# Patient Record
Sex: FEMALE | Race: WHITE | NOT HISPANIC OR LATINO | URBAN - METROPOLITAN AREA
[De-identification: names, ages, dates, MRNs, and addresses within clinical notes are randomized per-mention and may not be internally consistent; named-entity substitution may affect disease eponyms.]

---

## 2024-02-10 ENCOUNTER — INPATIENT (INPATIENT)
Facility: HOSPITAL | Age: 89
LOS: 3 days | Discharge: HOME | DRG: 178 | End: 2024-02-14
Attending: STUDENT IN AN ORGANIZED HEALTH CARE EDUCATION/TRAINING PROGRAM | Admitting: HOSPITALIST
Payer: MEDICARE

## 2024-02-10 VITALS
SYSTOLIC BLOOD PRESSURE: 146 MMHG | RESPIRATION RATE: 18 BRPM | DIASTOLIC BLOOD PRESSURE: 75 MMHG | TEMPERATURE: 99 F | OXYGEN SATURATION: 99 % | WEIGHT: 141.98 LBS | HEART RATE: 95 BPM

## 2024-02-10 DIAGNOSIS — Z90.49 ACQUIRED ABSENCE OF OTHER SPECIFIED PARTS OF DIGESTIVE TRACT: Chronic | ICD-10-CM

## 2024-02-10 DIAGNOSIS — N39.0 URINARY TRACT INFECTION, SITE NOT SPECIFIED: ICD-10-CM

## 2024-02-10 LAB
ALBUMIN SERPL ELPH-MCNC: 3.9 G/DL — SIGNIFICANT CHANGE UP (ref 3.5–5.2)
ALP SERPL-CCNC: 132 U/L — HIGH (ref 30–115)
ALT FLD-CCNC: 62 U/L — HIGH (ref 0–41)
ANION GAP SERPL CALC-SCNC: 14 MMOL/L — SIGNIFICANT CHANGE UP (ref 7–14)
APPEARANCE UR: ABNORMAL
AST SERPL-CCNC: 102 U/L — HIGH (ref 0–41)
BACTERIA # UR AUTO: ABNORMAL /HPF
BASOPHILS # BLD AUTO: 0.03 K/UL — SIGNIFICANT CHANGE UP (ref 0–0.2)
BASOPHILS NFR BLD AUTO: 0.4 % — SIGNIFICANT CHANGE UP (ref 0–1)
BILIRUB SERPL-MCNC: 0.3 MG/DL — SIGNIFICANT CHANGE UP (ref 0.2–1.2)
BILIRUB UR-MCNC: NEGATIVE — SIGNIFICANT CHANGE UP
BUN SERPL-MCNC: 17 MG/DL — SIGNIFICANT CHANGE UP (ref 10–20)
CALCIUM SERPL-MCNC: 9.3 MG/DL — SIGNIFICANT CHANGE UP (ref 8.4–10.4)
CAST: 2 /LPF — SIGNIFICANT CHANGE UP (ref 0–4)
CHLORIDE SERPL-SCNC: 103 MMOL/L — SIGNIFICANT CHANGE UP (ref 98–110)
CO2 SERPL-SCNC: 24 MMOL/L — SIGNIFICANT CHANGE UP (ref 17–32)
COLOR SPEC: YELLOW — SIGNIFICANT CHANGE UP
CREAT SERPL-MCNC: 0.9 MG/DL — SIGNIFICANT CHANGE UP (ref 0.7–1.5)
DIFF PNL FLD: NEGATIVE — SIGNIFICANT CHANGE UP
EGFR: 61 ML/MIN/1.73M2 — SIGNIFICANT CHANGE UP
EOSINOPHIL # BLD AUTO: 0 K/UL — SIGNIFICANT CHANGE UP (ref 0–0.7)
EOSINOPHIL NFR BLD AUTO: 0 % — SIGNIFICANT CHANGE UP (ref 0–8)
FLUAV AG NPH QL: SIGNIFICANT CHANGE UP
FLUBV AG NPH QL: SIGNIFICANT CHANGE UP
GLUCOSE SERPL-MCNC: 114 MG/DL — HIGH (ref 70–99)
GLUCOSE UR QL: NEGATIVE MG/DL — SIGNIFICANT CHANGE UP
HCT VFR BLD CALC: 39.6 % — SIGNIFICANT CHANGE UP (ref 37–47)
HGB BLD-MCNC: 12.4 G/DL — SIGNIFICANT CHANGE UP (ref 12–16)
IMM GRANULOCYTES NFR BLD AUTO: 0.4 % — HIGH (ref 0.1–0.3)
KETONES UR-MCNC: ABNORMAL MG/DL
LEUKOCYTE ESTERASE UR-ACNC: ABNORMAL
LIDOCAIN IGE QN: 36 U/L — SIGNIFICANT CHANGE UP (ref 7–60)
LYMPHOCYTES # BLD AUTO: 0.54 K/UL — LOW (ref 1.2–3.4)
LYMPHOCYTES # BLD AUTO: 7 % — LOW (ref 20.5–51.1)
MCHC RBC-ENTMCNC: 28.4 PG — SIGNIFICANT CHANGE UP (ref 27–31)
MCHC RBC-ENTMCNC: 31.3 G/DL — LOW (ref 32–37)
MCV RBC AUTO: 90.6 FL — SIGNIFICANT CHANGE UP (ref 81–99)
MONOCYTES # BLD AUTO: 0.76 K/UL — HIGH (ref 0.1–0.6)
MONOCYTES NFR BLD AUTO: 9.9 % — HIGH (ref 1.7–9.3)
NEUTROPHILS # BLD AUTO: 6.3 K/UL — SIGNIFICANT CHANGE UP (ref 1.4–6.5)
NEUTROPHILS NFR BLD AUTO: 82.3 % — HIGH (ref 42.2–75.2)
NITRITE UR-MCNC: POSITIVE
NRBC # BLD: 0 /100 WBCS — SIGNIFICANT CHANGE UP (ref 0–0)
PH UR: 5.5 — SIGNIFICANT CHANGE UP (ref 5–8)
PLATELET # BLD AUTO: 158 K/UL — SIGNIFICANT CHANGE UP (ref 130–400)
PMV BLD: 10.8 FL — HIGH (ref 7.4–10.4)
POTASSIUM SERPL-MCNC: 4.6 MMOL/L — SIGNIFICANT CHANGE UP (ref 3.5–5)
POTASSIUM SERPL-SCNC: 4.6 MMOL/L — SIGNIFICANT CHANGE UP (ref 3.5–5)
PROT SERPL-MCNC: 6.4 G/DL — SIGNIFICANT CHANGE UP (ref 6–8)
PROT UR-MCNC: 30 MG/DL
RBC # BLD: 4.37 M/UL — SIGNIFICANT CHANGE UP (ref 4.2–5.4)
RBC # FLD: 12.9 % — SIGNIFICANT CHANGE UP (ref 11.5–14.5)
RBC CASTS # UR COMP ASSIST: 2 /HPF — SIGNIFICANT CHANGE UP (ref 0–4)
RSV RNA NPH QL NAA+NON-PROBE: SIGNIFICANT CHANGE UP
SARS-COV-2 RNA SPEC QL NAA+PROBE: DETECTED
SODIUM SERPL-SCNC: 141 MMOL/L — SIGNIFICANT CHANGE UP (ref 135–146)
SP GR SPEC: 1.03 — SIGNIFICANT CHANGE UP (ref 1–1.03)
SQUAMOUS # UR AUTO: 1 /HPF — SIGNIFICANT CHANGE UP (ref 0–5)
UROBILINOGEN FLD QL: 1 MG/DL — SIGNIFICANT CHANGE UP (ref 0.2–1)
WBC # BLD: 7.66 K/UL — SIGNIFICANT CHANGE UP (ref 4.8–10.8)
WBC # FLD AUTO: 7.66 K/UL — SIGNIFICANT CHANGE UP (ref 4.8–10.8)
WBC UR QL: 10 /HPF — HIGH (ref 0–5)

## 2024-02-10 PROCEDURE — 74177 CT ABD & PELVIS W/CONTRAST: CPT | Mod: 26,MA

## 2024-02-10 PROCEDURE — 92610 EVALUATE SWALLOWING FUNCTION: CPT | Mod: GN

## 2024-02-10 PROCEDURE — 97116 GAIT TRAINING THERAPY: CPT | Mod: GP

## 2024-02-10 PROCEDURE — 84443 ASSAY THYROID STIM HORMONE: CPT

## 2024-02-10 PROCEDURE — 70450 CT HEAD/BRAIN W/O DYE: CPT | Mod: 26,MA

## 2024-02-10 PROCEDURE — 82565 ASSAY OF CREATININE: CPT

## 2024-02-10 PROCEDURE — 97530 THERAPEUTIC ACTIVITIES: CPT | Mod: GP

## 2024-02-10 PROCEDURE — 82550 ASSAY OF CK (CPK): CPT

## 2024-02-10 PROCEDURE — 80076 HEPATIC FUNCTION PANEL: CPT

## 2024-02-10 PROCEDURE — 72125 CT NECK SPINE W/O DYE: CPT | Mod: 26,MA

## 2024-02-10 PROCEDURE — 85025 COMPLETE CBC W/AUTO DIFF WBC: CPT

## 2024-02-10 PROCEDURE — 72170 X-RAY EXAM OF PELVIS: CPT | Mod: 26

## 2024-02-10 PROCEDURE — 36415 COLL VENOUS BLD VENIPUNCTURE: CPT

## 2024-02-10 PROCEDURE — 93010 ELECTROCARDIOGRAM REPORT: CPT

## 2024-02-10 PROCEDURE — 82977 ASSAY OF GGT: CPT

## 2024-02-10 PROCEDURE — 99285 EMERGENCY DEPT VISIT HI MDM: CPT

## 2024-02-10 PROCEDURE — 71260 CT THORAX DX C+: CPT | Mod: 26,MA

## 2024-02-10 PROCEDURE — 99053 MED SERV 10PM-8AM 24 HR FAC: CPT

## 2024-02-10 PROCEDURE — 93970 EXTREMITY STUDY: CPT

## 2024-02-10 PROCEDURE — 71045 X-RAY EXAM CHEST 1 VIEW: CPT | Mod: 26

## 2024-02-10 PROCEDURE — 97162 PT EVAL MOD COMPLEX 30 MIN: CPT | Mod: GP

## 2024-02-10 PROCEDURE — 93005 ELECTROCARDIOGRAM TRACING: CPT

## 2024-02-10 PROCEDURE — 80053 COMPREHEN METABOLIC PANEL: CPT

## 2024-02-10 PROCEDURE — 83735 ASSAY OF MAGNESIUM: CPT

## 2024-02-10 PROCEDURE — 99222 1ST HOSP IP/OBS MODERATE 55: CPT | Mod: GC

## 2024-02-10 RX ORDER — SIMVASTATIN 20 MG/1
1 TABLET, FILM COATED ORAL
Refills: 0 | DISCHARGE

## 2024-02-10 RX ORDER — SODIUM CHLORIDE 9 MG/ML
1000 INJECTION INTRAMUSCULAR; INTRAVENOUS; SUBCUTANEOUS ONCE
Refills: 0 | Status: COMPLETED | OUTPATIENT
Start: 2024-02-10 | End: 2024-02-10

## 2024-02-10 RX ORDER — CEFEPIME 1 G/1
2000 INJECTION, POWDER, FOR SOLUTION INTRAMUSCULAR; INTRAVENOUS EVERY 8 HOURS
Refills: 0 | Status: DISCONTINUED | OUTPATIENT
Start: 2024-02-10 | End: 2024-02-10

## 2024-02-10 RX ORDER — ATORVASTATIN CALCIUM 80 MG/1
40 TABLET, FILM COATED ORAL AT BEDTIME
Refills: 0 | Status: DISCONTINUED | OUTPATIENT
Start: 2024-02-10 | End: 2024-02-14

## 2024-02-10 RX ORDER — ACETAMINOPHEN 500 MG
650 TABLET ORAL ONCE
Refills: 0 | Status: COMPLETED | OUTPATIENT
Start: 2024-02-10 | End: 2024-02-10

## 2024-02-10 RX ORDER — AZITHROMYCIN 500 MG/1
500 TABLET, FILM COATED ORAL ONCE
Refills: 0 | Status: COMPLETED | OUTPATIENT
Start: 2024-02-10 | End: 2024-02-10

## 2024-02-10 RX ORDER — AZITHROMYCIN 500 MG/1
500 TABLET, FILM COATED ORAL EVERY 24 HOURS
Refills: 0 | Status: DISCONTINUED | OUTPATIENT
Start: 2024-02-11 | End: 2024-02-13

## 2024-02-10 RX ORDER — VANCOMYCIN HCL 1 G
1000 VIAL (EA) INTRAVENOUS ONCE
Refills: 0 | Status: COMPLETED | OUTPATIENT
Start: 2024-02-10 | End: 2024-02-10

## 2024-02-10 RX ORDER — CEFEPIME 1 G/1
INJECTION, POWDER, FOR SOLUTION INTRAMUSCULAR; INTRAVENOUS
Refills: 0 | Status: DISCONTINUED | OUTPATIENT
Start: 2024-02-10 | End: 2024-02-10

## 2024-02-10 RX ORDER — VANCOMYCIN HCL 1 G
VIAL (EA) INTRAVENOUS
Refills: 0 | Status: DISCONTINUED | OUTPATIENT
Start: 2024-02-10 | End: 2024-02-10

## 2024-02-10 RX ORDER — AZITHROMYCIN 500 MG/1
TABLET, FILM COATED ORAL
Refills: 0 | Status: DISCONTINUED | OUTPATIENT
Start: 2024-02-10 | End: 2024-02-13

## 2024-02-10 RX ORDER — VANCOMYCIN HCL 1 G
1000 VIAL (EA) INTRAVENOUS EVERY 12 HOURS
Refills: 0 | Status: DISCONTINUED | OUTPATIENT
Start: 2024-02-10 | End: 2024-02-10

## 2024-02-10 RX ORDER — CEFEPIME 1 G/1
2000 INJECTION, POWDER, FOR SOLUTION INTRAMUSCULAR; INTRAVENOUS ONCE
Refills: 0 | Status: COMPLETED | OUTPATIENT
Start: 2024-02-10 | End: 2024-02-10

## 2024-02-10 RX ORDER — SENNA PLUS 8.6 MG/1
2 TABLET ORAL AT BEDTIME
Refills: 0 | Status: DISCONTINUED | OUTPATIENT
Start: 2024-02-10 | End: 2024-02-14

## 2024-02-10 RX ORDER — CEFTRIAXONE 500 MG/1
1000 INJECTION, POWDER, FOR SOLUTION INTRAMUSCULAR; INTRAVENOUS EVERY 24 HOURS
Refills: 0 | Status: DISCONTINUED | OUTPATIENT
Start: 2024-02-10 | End: 2024-02-13

## 2024-02-10 RX ORDER — ENOXAPARIN SODIUM 100 MG/ML
40 INJECTION SUBCUTANEOUS EVERY 24 HOURS
Refills: 0 | Status: DISCONTINUED | OUTPATIENT
Start: 2024-02-10 | End: 2024-02-14

## 2024-02-10 RX ADMIN — Medication 650 MILLIGRAM(S): at 20:40

## 2024-02-10 RX ADMIN — Medication 650 MILLIGRAM(S): at 08:03

## 2024-02-10 RX ADMIN — Medication 250 MILLIGRAM(S): at 08:44

## 2024-02-10 RX ADMIN — AZITHROMYCIN 255 MILLIGRAM(S): 500 TABLET, FILM COATED ORAL at 14:39

## 2024-02-10 RX ADMIN — CEFTRIAXONE 100 MILLIGRAM(S): 500 INJECTION, POWDER, FOR SOLUTION INTRAMUSCULAR; INTRAVENOUS at 19:49

## 2024-02-10 RX ADMIN — CEFEPIME 100 MILLIGRAM(S): 1 INJECTION, POWDER, FOR SOLUTION INTRAMUSCULAR; INTRAVENOUS at 08:44

## 2024-02-10 RX ADMIN — SODIUM CHLORIDE 1000 MILLILITER(S): 9 INJECTION INTRAMUSCULAR; INTRAVENOUS; SUBCUTANEOUS at 08:44

## 2024-02-10 NOTE — H&P ADULT - NSHPPHYSICALEXAM_GEN_ALL_CORE
LOS:     VITALS:   T(C): 38.6 (02-10-24 @ 07:13), Max: 38.6 (02-10-24 @ 07:13)  HR: 98 (02-10-24 @ 06:58) (95 - 98)  BP: 146/75 (02-10-24 @ 06:05) (146/75 - 146/75)  RR: 18 (02-10-24 @ 06:05) (18 - 18)  SpO2: 99% (02-10-24 @ 06:05) (99% - 99%)    GENERAL: NAD, lying in bed comfortably  HEAD:  Atraumatic, Normocephalic  EYES: EOMI, PERRLA, conjunctiva and sclera clear  ENT: Moist mucous membranes  NECK: Supple, No JVD  CHEST/LUNG: Clear to auscultation bilaterally; No rales, rhonchi, wheezing, or rubs. Unlabored respirations  HEART: Regular rate and rhythm; No murmurs, rubs, or gallops  ABDOMEN: BSx4; Soft, nontender, nondistended  EXTREMITIES:  2+ Peripheral Pulses, brisk capillary refill. No clubbing, cyanosis, or edema  NERVOUS SYSTEM:  A&Ox3, 5/5 motor bilateral upper and lower extremity, no sensory deficit   SKIN: No rashes or lesions LOS:     VITALS:   T(C): 38.6 (02-10-24 @ 07:13), Max: 38.6 (02-10-24 @ 07:13)  HR: 98 (02-10-24 @ 06:58) (95 - 98)  BP: 146/75 (02-10-24 @ 06:05) (146/75 - 146/75)  RR: 18 (02-10-24 @ 06:05) (18 - 18)  SpO2: 99% (02-10-24 @ 06:05) (99% - 99%)    GENERAL: NAD, lying in bed comfortably, pleasant, answering questions   HEAD:  Atraumatic, Normocephalic  EYES: EOMI, PERRLA, conjunctiva and sclera clear  ENT: Moist mucous membranes  NECK: Supple, No JVD  CHEST/LUNG: Clear to auscultation bilaterally;   HEART: Regular rate and rhythm; No murmurs, rubs, or gallops  ABDOMEN: BSx4; Soft, nontender, nondistended  EXTREMITIES:  2+ Peripheral Pulses, no edema  NERVOUS SYSTEM:  A&Ox2, 5/5 motor bilateral upper and lower extremity, no sensory deficit   SKIN: No rashes or lesions

## 2024-02-10 NOTE — ED PROVIDER NOTE - PROGRESS NOTE DETAILS
Signout to Dr. Asif follow-up and dispo KINGSLEY-- pt signed out to me by Dr. Cooper pending CTs, labs, reeval    on reeval, pt resting comfortably, shivering, appears diaphoretic, rectal temp 101.5.   family report Patient is AO x 2, speaking clearly, finger to nose intact. Family report Pt is AOx2 at baseline, however yesterday was coughing and seemed more confused.  Son reports patient got up at about 2 AM and was behaving unusually, seemed more confused than usual, and had an episode of incontinence.  Daughter-in-law reports patient has been urinating more than usual.  Patient denies any dysuria, hematuria or known fever.  Family at bedside report Signout to Dr. Martinez follow-up and dispo KINGSLEY-- pt signed out to me by Dr. Cooper pending CTs, labs, reeval    on reeval, pt resting comfortably, shivering, appears diaphoretic, rectal temp 101.5.   family report Patient is AO x 2, speaking clearly, finger to nose intact. Family report Pt is AOx2 at baseline, however yesterday was coughing and seemed more confused.  Son reports patient got up at about 2 AM and was behaving unusually, seemed more confused than usual, and had an episode of incontinence.  Daughter-in-law reports patient has been urinating more than usual.  Patient denies any dysuria, hematuria or known fever. Results of CAT scans, labs, urinalysis reviewed and discussed with patient and family at bedside.    Patient has no abdominal pain, no abdominal tenderness on exam.  Patient and family confirm she did not fall onto her side or hit her abdomen against anything last night.    Patient started on antibiotics and fluids for UTI and possible pneumonia.  Patient and family agree with plan for admission.

## 2024-02-10 NOTE — ED ADULT NURSE NOTE - OBJECTIVE STATEMENT
pt presented to ED from home s/p unwitnessed fall from home. pt with recent frequent falls. hx of worsening dementia. as per daughter at bedside, daughter heard a "thump" and when she found pt laying on the floor, pt was "acting confused" since fall, pt returned back to baseline. denies any pain. denies any CP, sob, N/V/D. no injuries noted.

## 2024-02-10 NOTE — H&P ADULT - ATTENDING COMMENTS
89 yo F PMHx dementia, dyslipidemia presented for evaluation of a fall. Pt was getting changed upstairs. Family downstairs heard a thud and went upstairs to find her on the floor. This is the second time this week this occurred. Family does not believe she had LOC. AFter the first fall family took pt to her PMD who ordered a CTH which wasn't yet completed. AFter second fall family brought her here. Trauma work up was negative but while in ED pt found to be febrile. Family noticed that she is urinating more than normal.    Recurrent falls  Dementia   Deconditioning   - suspect underlying infection contributing  - pt eval, uses cane at baseline  - fall precautions  - trauma work up negative, pt noted to have severe chronic microvascular ischemic changes, thryoid nodule, and perisplenic fluid    Fever  Suspect pneumonia vs UTI  - sepsis not present on admission  - has cough  - CT chest: Indeterminate irregular right lower lobe pulmonary opacity with surrounding groundglass, measuring up to 1.8 x 1.1 cm. While   infectious/inflammatory etiology is on differential, cannot exclude neoplastic etiology given morphology.   - s/p cefepime and vancomycin in ED   - c/w  ceftriaxone and azithromycin for possible gram negative pneumonia  - will need repeat image as outpt for resolution  - f/u covid/flu PCR   - f/u blood cx, urine cultures  - f/u urine strep and legionella     Transaminitis   - pt s/p cholecystectomy   - possibly related to fall, check CK and ggt    DLD  - c/w simvastatin     Thyroid nodules  - outpt ultrasound    Discussed with family at bedside

## 2024-02-10 NOTE — ED ADULT TRIAGE NOTE - CHIEF COMPLAINT QUOTE
BIBA from home s/p unwitnessed fall. Patient with recent fall last week and worsening dementia. As per daughter patient with jumbled speech post fall but has since recovered to baseline. Patient denies pain and discomfort, no signs of trauma or injury noted. FS in triage 113.

## 2024-02-10 NOTE — ED PROVIDER NOTE - CLINICAL SUMMARY MEDICAL DECISION MAKING FREE TEXT BOX
Patient presented status post fall as documented.  On arrival to ED, patient initially borderline febrile, which then subsequently progressed to febrile in the ED to 101.5, but hemodynamically stable, fully neurovascularly intact, overall well-appearing, no external signs of trauma on exam.  Patient was seen on arrival which point patient obtained labs which were grossly unremarkable including no significant leukocytosis, anemia, signs of dehydration/ESTEBAN, transaminitis or significant electrolyte abnormalities.  UA showed likely UTI which is likely source of infection.  Pan scan was obtained and negative for acute traumatic injury or any other emergent pathologies.  Patient was started on IV antibiotics in the ED, given the above, in conjunction with the fact that patient unable to ambulate, patient will require admission for IV antibiotics and further management.  Hemodynamically stable at time of admission.

## 2024-02-10 NOTE — H&P ADULT - HISTORY OF PRESENT ILLNESS
89-year-old female past medical history of Dementia (AOx2 at baseline) and hyperlipidemia coming in here for repeated falls.  Patient had a fall approximately 1 week ago mechanical and today patient was attempting to put on her clothes when she bent forward and fell straight down.  Was "acting confused "for a couple minutes but now back to baseline. Patient currently denies any complaints or pain. Patient lives with her son and daughter-in-law who report patient has had a cough since yesterday and had 1 episode of urinary incontinence last night.    Vitals in ED: T 99.1, HR 95, /75, spo2 99% on RA   Pt had fever later in .5   Labs unremarkable except for mildly elevated LFTs   UA : WBC 10, too many bacteria, +ve nitrite, small LE   CT HEAD:  No acute intracranial findings.  Severe chronic microvascular ischemic changes.    CT CERVICAL SPINE:  No acute cervical fracture or facet subluxation.    CT Chest/A/P w IV con:   Trace subcapsular splenic fluid, age indeterminate and of indeterminate   etiology. Cannot exclude acute traumatic injury. Clinical correlation   recommended.  Indeterminate irregular right lower lobe pulmonary opacity with   surrounding groundglass, measuring up to 1.8 x 1.1 cm. While   infectious/inflammatory etiology is on differential, cannot exclude   neoplastic etiology given morphology. Recommend correlation with prior   imaging if available and further workup/short-term follow-up CT or PET/CT   evaluation.  Thyroid nodules. Recommend follow with outpatient sonography.    Patient admitted for workup of recurrent falls.  89-year-old female past medical history of Dementia (AOx2 at baseline) and hyperlipidemia coming in here for recurrent falls. The son and daughter in law with whom the patient lives are at bedside. Patient reported feeling well, does not remember what happened or how she fell. She reported only cough productive of whitish sputum for the last week. Acc to daughter in law pt first had an unwitnessed fall last week where they heard her fall but did not see how it happened. No LOC. Today at 4 am in the morning they heard her fall again in her room. They found her in the floor wet with her pants off. She was trying to change her clothes and tripped and fell. No LOC, +ve head trauma. Acc to family pt is oriented to person and place not to time at baseline. Sometimes she wakes up confused at night and wanders inside the house. This also happened this week. The urinary incontinence is not new, it has been going on for 6 months now. She uses a cane usually, but this week thy noticed she is more weak and shaking when walking. No fever, chills, abdominal pain, N&V, dysuria, sore throat, SOB, chest pain, focal weakness, headache, blurry visio, slurred speech.     Vitals in ED: T 99.1, HR 95, /75, spo2 99% on RA   Pt had fever later in .5   Labs unremarkable except for mildly elevated LFTs   UA : WBC 10, too many bacteria, +ve nitrite, small LE   CT HEAD:  No acute intracranial findings.  Severe chronic microvascular ischemic changes.  CT CERVICAL SPINE:  No acute cervical fracture or facet subluxation.  CT Chest/A/P w IV con:   Trace subcapsular splenic fluid, age indeterminate and of indeterminate   etiology. Cannot exclude acute traumatic injury. Clinical correlation   recommended.  Indeterminate irregular right lower lobe pulmonary opacity with   surrounding groundglass, measuring up to 1.8 x 1.1 cm. While   infectious/inflammatory etiology is on differential, cannot exclude   neoplastic etiology given morphology. Recommend correlation with prior   imaging if available and further workup/short-term follow-up CT or PET/CT   evaluation.  Thyroid nodules. Recommend follow with outpatient sonography.    Patient admitted for workup of recurrent falls.

## 2024-02-10 NOTE — H&P ADULT - CONVERSATION DETAILS
Family reports DNR/DNI status, states they have MOLST at home which they will bring in. If not will have to complete another here, aware full code until documentation provided

## 2024-02-10 NOTE — ED ADULT NURSE NOTE - NSFALLHARMRISKINTERV_ED_ALL_ED

## 2024-02-10 NOTE — ED PROVIDER NOTE - CARE PLAN
Principal Discharge DX:	Urinary tract infection  Secondary Diagnosis:	Fever  Secondary Diagnosis:	Fall   1

## 2024-02-10 NOTE — ED PROVIDER NOTE - ATTENDING CONTRIBUTION TO CARE
89 year-old female to ED after fall.  Patient was attempting to go to the bathroom found by her daughter on the ground.  Was initially a little confused.  No fevers no sick contacts or travels but did have some incontinence.  Now seems back to her baseline and no specific complaints.  She did also have a fall last week so was concerned about head trauma.  vital signs stable exam as noted clear lungs bilaterally conjunctiva pink HEENT normal, regular rate and rhythm abdomen soft nontender and neuro nonfocal. No Residual Tumor Seen Histology Text: There were no malignant cells seen in the sections examined.

## 2024-02-10 NOTE — H&P ADULT - ASSESSMENT
89-year-old female past medical history of Dementia (AOx2 at baseline) and hyperlipidemia coming in here for recurrent falls.     #Recurrent falls   #Hx of dementia   #Deconditioning vs TME from possible PNA   - pt currently at baseline   - CT HEAD: No acute intracranial findings.  Severe chronic microvascular ischemic changes.  - trauma workup negative   - PT eval ( uses cane at baseline )   - low suspicion for cardiac cause, will not be on tele   - low suspicion for seizures, will hold on EEG     #RLL PNA   - pt with fever, cough, generalized weakness   - CT chest: Indeterminate irregular right lower lobe pulmonary opacity with   surrounding groundglass, measuring up to 1.8 x 1.1 cm. While   infectious/inflammatory etiology is on differential, cannot exclude   neoplastic etiology given morphology. Recommend correlation with prior   imaging if available and further workup/short-term follow-up CT or PET/CT   evaluation.  - s/p cefepime and vancomycin in ED   - not septic on admission   - will start pt on ceftriaxone and azithromycin for CAP   - f/u covid/flu PCR   - f/u blood cx     #Asymptomatic bacteruria   - pt asymptomatic   - f/u urine cx   - will receive ceftriaxone anyways for PNA     #Transaminitis   - pt s/p cholecystectomy   - no abd pain   - CT A/P showing CBD dilation which can be NL post cholecystectomy   - trend LFTs, if increasing consider RUQ US     #DL   - c/w simvastatin     #Thyroid nodules  - OP f/u     #DVT ppx: lovenox   #GI ppx: NA   #Diet: DASH   #Activity: as tolerated    89-year-old female past medical history of Dementia (AOx2 at baseline) and hyperlipidemia coming in here for recurrent falls.     #Recurrent falls  #Hx of dementia   #Deconditioning vs TME and weakness from possible PNA   - pt currently at baseline   - CT HEAD: No acute intracranial findings.  Severe chronic microvascular ischemic changes.  - trauma workup negative   - PT eval ( uses cane at baseline )     #RLL PNA   - pt with fever, cough, generalized weakness   - CT chest: Indeterminate irregular right lower lobe pulmonary opacity with   surrounding groundglass, measuring up to 1.8 x 1.1 cm. While   infectious/inflammatory etiology is on differential, cannot exclude   neoplastic etiology given morphology. Recommend correlation with prior   imaging if available and further workup/short-term follow-up CT or PET/CT   evaluation.  - s/p cefepime and vancomycin in ED   - not septic on admission   - will start pt on ceftriaxone and azithromycin for CAP   - f/u covid/flu PCR   - f/u blood cx   - f/u urine strep and legionella     #Asymptomatic bacteruria   - pt asymptomatic   - f/u urine cx   - will receive ceftriaxone anyways for PNA     #Transaminitis   - pt s/p cholecystectomy   - no abd pain   - CT A/P showing CBD dilation which can be NL post cholecystectomy   - trend LFTs, if increasing consider RUQ US     #DL   - c/w simvastatin     #Thyroid nodules  - OP f/u     #DVT ppx: lovenox   #GI ppx: NA   #Diet: DASH   #Activity: as tolerated

## 2024-02-10 NOTE — ED PROVIDER NOTE - CONSIDERATION OF ADMISSION OBSERVATION
Consideration of Admission/Observation Patient s/p fall with (+) sepsis likely 2/2 UTI. Will require admission for IV abx

## 2024-02-10 NOTE — ED PROVIDER NOTE - OBJECTIVE STATEMENT
9-year-old female past medical history of Dementia and hyperlipidemia coming in here for repeated falls.  Patient had a fall approximately 1 week ago mechanical and today patient was attempting to put on her clothes when she bent forward and fell straight down.  Was "acting confused "for a couple minutes but now back to baseline.  And O x 2.  Here with family.  No other complaints. 89-year-old female past medical history of Dementia (AOx2 at baseline) and hyperlipidemia coming in here for repeated falls.  Patient had a fall approximately 1 week ago mechanical and today patient was attempting to put on her clothes when she bent forward and fell straight down.  Was "acting confused "for a couple minutes but now back to baseline. Patient currently denies any complaints or pain. Patient lives with her son and daughter-in-law who report patient has had a cough since yesterday and had 1 episode of urinary incontinence last night.

## 2024-02-10 NOTE — ED PROVIDER NOTE - DIFFERENTIAL DIAGNOSIS
Differential Diagnosis Sepsis.  Urinary tract infection.  Close range. Fall r/o acute traumatic injury. Also assess for underlying causes of frequent falls, including sepsis/UTI, cardiac etiology vs electrolyte abnormality vs dehydration vs anemia vs other metabolic derangement.

## 2024-02-11 LAB
ALBUMIN SERPL ELPH-MCNC: 3.5 G/DL — SIGNIFICANT CHANGE UP (ref 3.5–5.2)
ALP SERPL-CCNC: 120 U/L — HIGH (ref 30–115)
ALT FLD-CCNC: 56 U/L — HIGH (ref 0–41)
ANION GAP SERPL CALC-SCNC: 13 MMOL/L — SIGNIFICANT CHANGE UP (ref 7–14)
AST SERPL-CCNC: 74 U/L — HIGH (ref 0–41)
BASOPHILS # BLD AUTO: 0 K/UL — SIGNIFICANT CHANGE UP (ref 0–0.2)
BASOPHILS NFR BLD AUTO: 0 % — SIGNIFICANT CHANGE UP (ref 0–1)
BILIRUB SERPL-MCNC: 0.2 MG/DL — SIGNIFICANT CHANGE UP (ref 0.2–1.2)
BUN SERPL-MCNC: 19 MG/DL — SIGNIFICANT CHANGE UP (ref 10–20)
BURR CELLS BLD QL SMEAR: PRESENT — SIGNIFICANT CHANGE UP
CALCIUM SERPL-MCNC: 8.9 MG/DL — SIGNIFICANT CHANGE UP (ref 8.4–10.5)
CHLORIDE SERPL-SCNC: 107 MMOL/L — SIGNIFICANT CHANGE UP (ref 98–110)
CK SERPL-CCNC: 680 U/L — HIGH (ref 0–225)
CO2 SERPL-SCNC: 20 MMOL/L — SIGNIFICANT CHANGE UP (ref 17–32)
CREAT SERPL-MCNC: 0.9 MG/DL — SIGNIFICANT CHANGE UP (ref 0.7–1.5)
EGFR: 61 ML/MIN/1.73M2 — SIGNIFICANT CHANGE UP
EOSINOPHIL # BLD AUTO: 0.05 K/UL — SIGNIFICANT CHANGE UP (ref 0–0.7)
EOSINOPHIL NFR BLD AUTO: 0.9 % — SIGNIFICANT CHANGE UP (ref 0–8)
GGT SERPL-CCNC: 40 U/L — SIGNIFICANT CHANGE UP (ref 1–40)
GIANT PLATELETS BLD QL SMEAR: PRESENT — SIGNIFICANT CHANGE UP
GLUCOSE SERPL-MCNC: 84 MG/DL — SIGNIFICANT CHANGE UP (ref 70–99)
HCT VFR BLD CALC: 37.6 % — SIGNIFICANT CHANGE UP (ref 37–47)
HGB BLD-MCNC: 11.8 G/DL — LOW (ref 12–16)
LYMPHOCYTES # BLD AUTO: 0.42 K/UL — LOW (ref 1.2–3.4)
LYMPHOCYTES # BLD AUTO: 7.2 % — LOW (ref 20.5–51.1)
MANUAL SMEAR VERIFICATION: SIGNIFICANT CHANGE UP
MCHC RBC-ENTMCNC: 28.3 PG — SIGNIFICANT CHANGE UP (ref 27–31)
MCHC RBC-ENTMCNC: 31.4 G/DL — LOW (ref 32–37)
MCV RBC AUTO: 90.2 FL — SIGNIFICANT CHANGE UP (ref 81–99)
MONOCYTES # BLD AUTO: 1.1 K/UL — HIGH (ref 0.1–0.6)
MONOCYTES NFR BLD AUTO: 18.9 % — HIGH (ref 1.7–9.3)
NEUTROPHILS # BLD AUTO: 3.89 K/UL — SIGNIFICANT CHANGE UP (ref 1.4–6.5)
NEUTROPHILS NFR BLD AUTO: 65.8 % — SIGNIFICANT CHANGE UP (ref 42.2–75.2)
NEUTS BAND # BLD: 0.9 % — SIGNIFICANT CHANGE UP (ref 0–6)
OVALOCYTES BLD QL SMEAR: SLIGHT — SIGNIFICANT CHANGE UP
PLAT MORPH BLD: NORMAL — SIGNIFICANT CHANGE UP
PLATELET # BLD AUTO: 135 K/UL — SIGNIFICANT CHANGE UP (ref 130–400)
PMV BLD: 11 FL — HIGH (ref 7.4–10.4)
POIKILOCYTOSIS BLD QL AUTO: SIGNIFICANT CHANGE UP
POTASSIUM SERPL-MCNC: 4 MMOL/L — SIGNIFICANT CHANGE UP (ref 3.5–5)
POTASSIUM SERPL-SCNC: 4 MMOL/L — SIGNIFICANT CHANGE UP (ref 3.5–5)
PROT SERPL-MCNC: 5.9 G/DL — LOW (ref 6–8)
RBC # BLD: 4.17 M/UL — LOW (ref 4.2–5.4)
RBC # FLD: 13.1 % — SIGNIFICANT CHANGE UP (ref 11.5–14.5)
RBC BLD AUTO: ABNORMAL
SMUDGE CELLS # BLD: PRESENT — SIGNIFICANT CHANGE UP
SODIUM SERPL-SCNC: 140 MMOL/L — SIGNIFICANT CHANGE UP (ref 135–146)
VARIANT LYMPHS # BLD: 6.3 % — HIGH (ref 0–5)
WBC # BLD: 5.83 K/UL — SIGNIFICANT CHANGE UP (ref 4.8–10.8)
WBC # FLD AUTO: 5.83 K/UL — SIGNIFICANT CHANGE UP (ref 4.8–10.8)

## 2024-02-11 PROCEDURE — 99232 SBSQ HOSP IP/OBS MODERATE 35: CPT

## 2024-02-11 RX ADMIN — AZITHROMYCIN 255 MILLIGRAM(S): 500 TABLET, FILM COATED ORAL at 13:28

## 2024-02-11 RX ADMIN — ATORVASTATIN CALCIUM 40 MILLIGRAM(S): 80 TABLET, FILM COATED ORAL at 00:59

## 2024-02-11 RX ADMIN — ATORVASTATIN CALCIUM 40 MILLIGRAM(S): 80 TABLET, FILM COATED ORAL at 22:04

## 2024-02-11 RX ADMIN — SENNA PLUS 2 TABLET(S): 8.6 TABLET ORAL at 00:59

## 2024-02-11 RX ADMIN — ENOXAPARIN SODIUM 40 MILLIGRAM(S): 100 INJECTION SUBCUTANEOUS at 00:59

## 2024-02-11 RX ADMIN — CEFTRIAXONE 100 MILLIGRAM(S): 500 INJECTION, POWDER, FOR SOLUTION INTRAMUSCULAR; INTRAVENOUS at 19:41

## 2024-02-11 RX ADMIN — SENNA PLUS 2 TABLET(S): 8.6 TABLET ORAL at 22:03

## 2024-02-11 NOTE — PROGRESS NOTE ADULT - ASSESSMENT
89 yo F PMHx dementia, dyslipidemia presented for evaluation of a fall. Pt was getting changed upstairs. Family downstairs heard a thud and went upstairs to find her on the floor. This is the second time this week this occurred. Family does not believe she had LOC. AFter the first fall family took pt to her PMD who ordered a CTH which wasn't yet completed. AFter second fall family brought her here. Trauma work up was negative but while in ED pt found to be febrile. Family noticed that she is urinating more than normal.    Assessment    Recurrent falls with deconditioning; ?Dementia  Episode of confusion yest per family, resolved, now at baseline mental status  Fever with +ve UA, and CT Chest showing RLL opacity ?infection vs inflammation vs neoplasm  Cough, throat pain with COVID+   New onset difficulty swallowing reported  Transaminitis, improving; CK elevated  Thyroid nodules    Plan:  PT eval, uses cane at baseline; fall precautions; Trauma w/u neg  Continue Rocephin and Azithromycin for now  F/u blood cx and urine cx; f/u urine strep and legionella   Isolation precautions  Repeat o/p CT Chest to f/u RLL opacity  Outpt thyroid ultrasound  Resume home meds  SLP eval; Aspiration precautions.   Monitor LFTs daily. F/u ggt    Pending: urine and blood cx, iv abx, SLP eval  Disposition: home w/ home care vs SNF ?   Discussed with pt and  at bedside

## 2024-02-12 ENCOUNTER — TRANSCRIPTION ENCOUNTER (OUTPATIENT)
Age: 89
End: 2024-02-12

## 2024-02-12 LAB
ALBUMIN SERPL ELPH-MCNC: 4 G/DL — SIGNIFICANT CHANGE UP (ref 3.5–5.2)
ALP SERPL-CCNC: 136 U/L — HIGH (ref 30–115)
ALT FLD-CCNC: 51 U/L — HIGH (ref 0–41)
ANION GAP SERPL CALC-SCNC: 14 MMOL/L — SIGNIFICANT CHANGE UP (ref 7–14)
AST SERPL-CCNC: 60 U/L — HIGH (ref 0–41)
BASOPHILS # BLD AUTO: 0.03 K/UL — SIGNIFICANT CHANGE UP (ref 0–0.2)
BASOPHILS NFR BLD AUTO: 0.6 % — SIGNIFICANT CHANGE UP (ref 0–1)
BILIRUB SERPL-MCNC: 0.2 MG/DL — SIGNIFICANT CHANGE UP (ref 0.2–1.2)
BUN SERPL-MCNC: 18 MG/DL — SIGNIFICANT CHANGE UP (ref 10–20)
CALCIUM SERPL-MCNC: 9.5 MG/DL — SIGNIFICANT CHANGE UP (ref 8.4–10.5)
CHLORIDE SERPL-SCNC: 104 MMOL/L — SIGNIFICANT CHANGE UP (ref 98–110)
CO2 SERPL-SCNC: 23 MMOL/L — SIGNIFICANT CHANGE UP (ref 17–32)
CREAT SERPL-MCNC: 0.7 MG/DL — SIGNIFICANT CHANGE UP (ref 0.7–1.5)
EGFR: 83 ML/MIN/1.73M2 — SIGNIFICANT CHANGE UP
EOSINOPHIL # BLD AUTO: 0.05 K/UL — SIGNIFICANT CHANGE UP (ref 0–0.7)
EOSINOPHIL NFR BLD AUTO: 1 % — SIGNIFICANT CHANGE UP (ref 0–8)
GLUCOSE SERPL-MCNC: 96 MG/DL — SIGNIFICANT CHANGE UP (ref 70–99)
HCT VFR BLD CALC: 43.6 % — SIGNIFICANT CHANGE UP (ref 37–47)
HGB BLD-MCNC: 13.5 G/DL — SIGNIFICANT CHANGE UP (ref 12–16)
IMM GRANULOCYTES NFR BLD AUTO: 0.2 % — SIGNIFICANT CHANGE UP (ref 0.1–0.3)
LYMPHOCYTES # BLD AUTO: 1.24 K/UL — SIGNIFICANT CHANGE UP (ref 1.2–3.4)
LYMPHOCYTES # BLD AUTO: 25.8 % — SIGNIFICANT CHANGE UP (ref 20.5–51.1)
MCHC RBC-ENTMCNC: 27.7 PG — SIGNIFICANT CHANGE UP (ref 27–31)
MCHC RBC-ENTMCNC: 31 G/DL — LOW (ref 32–37)
MCV RBC AUTO: 89.5 FL — SIGNIFICANT CHANGE UP (ref 81–99)
MONOCYTES # BLD AUTO: 0.73 K/UL — HIGH (ref 0.1–0.6)
MONOCYTES NFR BLD AUTO: 15.2 % — HIGH (ref 1.7–9.3)
NEUTROPHILS # BLD AUTO: 2.75 K/UL — SIGNIFICANT CHANGE UP (ref 1.4–6.5)
NEUTROPHILS NFR BLD AUTO: 57.2 % — SIGNIFICANT CHANGE UP (ref 42.2–75.2)
NRBC # BLD: 0 /100 WBCS — SIGNIFICANT CHANGE UP (ref 0–0)
PLATELET # BLD AUTO: 158 K/UL — SIGNIFICANT CHANGE UP (ref 130–400)
PMV BLD: 11.1 FL — HIGH (ref 7.4–10.4)
POTASSIUM SERPL-MCNC: 4.5 MMOL/L — SIGNIFICANT CHANGE UP (ref 3.5–5)
POTASSIUM SERPL-SCNC: 4.5 MMOL/L — SIGNIFICANT CHANGE UP (ref 3.5–5)
PROT SERPL-MCNC: 6.7 G/DL — SIGNIFICANT CHANGE UP (ref 6–8)
RBC # BLD: 4.87 M/UL — SIGNIFICANT CHANGE UP (ref 4.2–5.4)
RBC # FLD: 13.1 % — SIGNIFICANT CHANGE UP (ref 11.5–14.5)
SODIUM SERPL-SCNC: 141 MMOL/L — SIGNIFICANT CHANGE UP (ref 135–146)
WBC # BLD: 4.81 K/UL — SIGNIFICANT CHANGE UP (ref 4.8–10.8)
WBC # FLD AUTO: 4.81 K/UL — SIGNIFICANT CHANGE UP (ref 4.8–10.8)

## 2024-02-12 PROCEDURE — 93970 EXTREMITY STUDY: CPT | Mod: 26

## 2024-02-12 PROCEDURE — 99232 SBSQ HOSP IP/OBS MODERATE 35: CPT

## 2024-02-12 RX ORDER — REMDESIVIR 5 MG/ML
INJECTION INTRAVENOUS
Refills: 0 | Status: DISCONTINUED | OUTPATIENT
Start: 2024-02-12 | End: 2024-02-13

## 2024-02-12 RX ORDER — NIFEDIPINE 30 MG
60 TABLET, EXTENDED RELEASE 24 HR ORAL DAILY
Refills: 0 | Status: DISCONTINUED | OUTPATIENT
Start: 2024-02-12 | End: 2024-02-14

## 2024-02-12 RX ORDER — REMDESIVIR 5 MG/ML
100 INJECTION INTRAVENOUS EVERY 24 HOURS
Refills: 0 | Status: DISCONTINUED | OUTPATIENT
Start: 2024-02-13 | End: 2024-02-13

## 2024-02-12 RX ORDER — REMDESIVIR 5 MG/ML
200 INJECTION INTRAVENOUS EVERY 24 HOURS
Refills: 0 | Status: COMPLETED | OUTPATIENT
Start: 2024-02-12 | End: 2024-02-12

## 2024-02-12 RX ADMIN — SENNA PLUS 2 TABLET(S): 8.6 TABLET ORAL at 21:26

## 2024-02-12 RX ADMIN — ATORVASTATIN CALCIUM 40 MILLIGRAM(S): 80 TABLET, FILM COATED ORAL at 21:26

## 2024-02-12 RX ADMIN — Medication 60 MILLIGRAM(S): at 13:06

## 2024-02-12 RX ADMIN — REMDESIVIR 200 MILLIGRAM(S): 5 INJECTION INTRAVENOUS at 13:40

## 2024-02-12 RX ADMIN — AZITHROMYCIN 255 MILLIGRAM(S): 500 TABLET, FILM COATED ORAL at 13:40

## 2024-02-12 RX ADMIN — CEFTRIAXONE 100 MILLIGRAM(S): 500 INJECTION, POWDER, FOR SOLUTION INTRAMUSCULAR; INTRAVENOUS at 18:58

## 2024-02-12 NOTE — PHYSICAL THERAPY INITIAL EVALUATION ADULT - ADDITIONAL COMMENTS
Pt lives in house with daughter in law and son, reports no stairs to enter or stairs inside but pt poor historian, unable to confirm accuracy. Pt ambulates with SC or RW. Per chart, pt with hx of 2 falls in the last week prompting this admission.

## 2024-02-12 NOTE — SWALLOW BEDSIDE ASSESSMENT ADULT - PHARYNGEAL PHASE
cough x1 for consecutive sips of thin liquids via straw sips/Cough post oral intake/Within functional limits

## 2024-02-12 NOTE — PHYSICAL THERAPY INITIAL EVALUATION ADULT - IMPAIRMENTS FOUND, PT EVAL
aerobic capacity/endurance/arousal, attention, and cognition/gait, locomotion, and balance/gross motor/muscle strength/poor safety awareness

## 2024-02-12 NOTE — PROGRESS NOTE ADULT - ASSESSMENT
88 yo F PMHx dementia, dyslipidemia presented for evaluation of a fall. Pt was getting changed upstairs. Family downstairs heard a thud and went upstairs to find her on the floor. This is the second time this week this occurred. Family does not believe she had LOC. AFter the first fall family took pt to her PMD who ordered a CTH which wasn't yet completed. AFter second fall family brought her here. Trauma work up was negative but while in ED pt found to be febrile. Family noticed that she is urinating more than normal.    Assessment    Recurrent falls with deconditioning; ?Dementia  Fever with +ve UA, and CT Chest showing RLL opacity ?infection vs inflammation vs neoplasm  Cough, throat pain with COVID+ pneumonia?  New onset difficulty swallowing reported  Transaminitis, improving; CK elevated  Thyroid nodules  UTI    Plan:  PT eval, uses cane at baseline; fall precautions; Trauma w/u neg  Continue Rocephin and Azithromycin for now  F/u blood cx and urine cx; f/u urine strep and legionella   Isolation precautions  Repeat o/p CT Chest to f/u RLL opacity  Outpt thyroid ultrasound  Resume home meds  SLP eval; Aspiration precautions.   Monitor LFTs daily. F/u ggt  RDV, ID consult  orthostats   PT     #Progress Note Handoff  Pending (specify):  as above  Family discussion: house staff updated pt family  Disposition: SNF vs home  Decision to admit the pt is based on acuity as above        88 yo F PMHx dementia, dyslipidemia presented for evaluation of a fall. Pt was getting changed upstairs. Family downstairs heard a thud and went upstairs to find her on the floor. This is the second time this week this occurred. Family does not believe she had LOC. AFter the first fall family took pt to her PMD who ordered a CTH which wasn't yet completed. AFter second fall family brought her here. Trauma work up was negative but while in ED pt found to be febrile. Family noticed that she is urinating more than normal.    Assessment    Recurrent falls with deconditioning; ?Dementia  Fever with +ve UA, and CT Chest showing RLL opacity ?infection vs inflammation vs neoplasm  Cough, throat pain with COVID+ pneumonia?  New onset difficulty swallowing reported  Transaminitis, improving; CK elevated  Thyroid nodules  UTI  HTN    Plan:  PT eval, uses cane at baseline; fall precautions; Trauma w/u neg  Continue Rocephin and Azithromycin for now  F/u blood cx and urine cx; f/u urine strep and legionella   Isolation precautions  Repeat o/p CT Chest to f/u RLL opacity  Outpt thyroid ultrasound  Resume home meds  SLP eval; Aspiration precautions.   Monitor LFTs daily. F/u ggt  RDV, ID consult  orthostats   PT   started on procardia    #Progress Note Handoff  Pending (specify):  as above  Family discussion: house staff updated pt family  Disposition: SNF vs home  Decision to admit the pt is based on acuity as above

## 2024-02-12 NOTE — PHYSICAL THERAPY INITIAL EVALUATION ADULT - GENERAL OBSERVATIONS, REHAB EVAL
[phone] Pt received and left semifowlers in bed, NAD, pt agreeable to PT session +rock n roller +primafit

## 2024-02-12 NOTE — SWALLOW BEDSIDE ASSESSMENT ADULT - SLP GENERAL OBSERVATIONS
Pt. received in bed awake and alert. Oriented to person, place, and event. Unaware of month, date, and year.

## 2024-02-12 NOTE — PHYSICAL THERAPY INITIAL EVALUATION ADULT - PERTINENT HX OF CURRENT PROBLEM, REHAB EVAL
89-year-old female past medical history of Dementia (AOx2 at baseline) and hyperlipidemia coming in here for recurrent falls. The son and daughter in law with whom the patient lives are at bedside. Patient reported feeling well, does not remember what happened or how she fell. She reported only cough productive of whitish sputum for the last week. Acc to daughter in law pt first had an unwitnessed fall last week where they heard her fall but did not see how it happened. No LOC. Today at 4 am in the morning they heard her fall again in her room. They found her in the floor wet with her pants off. She was trying to change her clothes and tripped and fell. No LOC, +ve head trauma. Acc to family pt is oriented to person and place not to time at baseline. Sometimes she wakes up confused at night and wanders inside the house. This also happened this week. The urinary incontinence is not new, it has been going on for 6 months now. She uses a cane usually, but this week thy noticed she is more weak and shaking when walking. No fever, chills, abdominal pain, N&V, dysuria, sore throat, SOB, chest pain, focal weakness, headache, blurry visio, slurred speech.

## 2024-02-12 NOTE — PHYSICAL THERAPY INITIAL EVALUATION ADULT - HEALTH SCREEN CRITERIA
[FreeTextEntry1] : Annita is now 9 weeks post op from lap g tube. She has heaped up granulation tissue which needs to be treated with Silver Nitrate. I treated the area with Silver Nitrate while protecting healthy skin. She will come back next week for formal 6 week post op g-tube teaching. Annita needs to be held down to be examined and screams the entire time. It was not possible to teach mom properly and treat the granulation tissue today. Her mom agrees. She will follow up next week for teaching. All questions answered.  
yes

## 2024-02-12 NOTE — DISCHARGE NOTE NURSING/CASE MANAGEMENT/SOCIAL WORK - PATIENT PORTAL LINK FT
You can access the FollowMyHealth Patient Portal offered by [facility] by registering at the following website: [email or web address]. By joining [employer]’s FollowMyHealth portal, you will also be able to view your health information using other applications (apps) compatible with our system.

## 2024-02-12 NOTE — SWALLOW BEDSIDE ASSESSMENT ADULT - SLP PERTINENT HISTORY OF CURRENT PROBLEM
88 yo F PMHx dementia, dyslipidemia presented for evaluation of a fall. Pt was getting changed upstairs. Family downstairs heard a thud and went upstairs to find her on the floor. This is the second time this week this occurred. Family does not believe she had LOC. AFter the first fall family took pt to her PMD who ordered a CTH which wasn't yet completed. AFter second fall family brought her here. Trauma work up was negative but while in ED pt found to be febrile. Family noticed that she is urinating more than normal.

## 2024-02-13 LAB
-  AMOXICILLIN/CLAVULANIC ACID: SIGNIFICANT CHANGE UP
-  AMPICILLIN/SULBACTAM: SIGNIFICANT CHANGE UP
-  AMPICILLIN: SIGNIFICANT CHANGE UP
-  AZTREONAM: SIGNIFICANT CHANGE UP
-  CEFAZOLIN: SIGNIFICANT CHANGE UP
-  CEFEPIME: SIGNIFICANT CHANGE UP
-  CEFOXITIN: SIGNIFICANT CHANGE UP
-  CEFTRIAXONE: SIGNIFICANT CHANGE UP
-  CEFUROXIME: SIGNIFICANT CHANGE UP
-  CIPROFLOXACIN: SIGNIFICANT CHANGE UP
-  ERTAPENEM: SIGNIFICANT CHANGE UP
-  GENTAMICIN: SIGNIFICANT CHANGE UP
-  IMIPENEM: SIGNIFICANT CHANGE UP
-  LEVOFLOXACIN: SIGNIFICANT CHANGE UP
-  MEROPENEM: SIGNIFICANT CHANGE UP
-  NITROFURANTOIN: SIGNIFICANT CHANGE UP
-  PIPERACILLIN/TAZOBACTAM: SIGNIFICANT CHANGE UP
-  TOBRAMYCIN: SIGNIFICANT CHANGE UP
-  TRIMETHOPRIM/SULFAMETHOXAZOLE: SIGNIFICANT CHANGE UP
ALBUMIN SERPL ELPH-MCNC: 3.4 G/DL — LOW (ref 3.5–5.2)
ALBUMIN SERPL ELPH-MCNC: 3.7 G/DL — SIGNIFICANT CHANGE UP (ref 3.5–5.2)
ALP SERPL-CCNC: 113 U/L — SIGNIFICANT CHANGE UP (ref 30–115)
ALP SERPL-CCNC: 124 U/L — HIGH (ref 30–115)
ALT FLD-CCNC: 37 U/L — SIGNIFICANT CHANGE UP (ref 0–41)
ALT FLD-CCNC: 41 U/L — SIGNIFICANT CHANGE UP (ref 0–41)
ANION GAP SERPL CALC-SCNC: 11 MMOL/L — SIGNIFICANT CHANGE UP (ref 7–14)
AST SERPL-CCNC: 36 U/L — SIGNIFICANT CHANGE UP (ref 0–41)
AST SERPL-CCNC: 39 U/L — SIGNIFICANT CHANGE UP (ref 0–41)
BASOPHILS # BLD AUTO: 0.03 K/UL — SIGNIFICANT CHANGE UP (ref 0–0.2)
BASOPHILS NFR BLD AUTO: 0.6 % — SIGNIFICANT CHANGE UP (ref 0–1)
BILIRUB DIRECT SERPL-MCNC: <0.2 MG/DL — SIGNIFICANT CHANGE UP (ref 0–0.3)
BILIRUB INDIRECT FLD-MCNC: SIGNIFICANT CHANGE UP MG/DL (ref 0.2–1.2)
BILIRUB SERPL-MCNC: <0.2 MG/DL — SIGNIFICANT CHANGE UP (ref 0.2–1.2)
BILIRUB SERPL-MCNC: <0.2 MG/DL — SIGNIFICANT CHANGE UP (ref 0.2–1.2)
BUN SERPL-MCNC: 16 MG/DL — SIGNIFICANT CHANGE UP (ref 10–20)
CALCIUM SERPL-MCNC: 8.6 MG/DL — SIGNIFICANT CHANGE UP (ref 8.4–10.5)
CHLORIDE SERPL-SCNC: 104 MMOL/L — SIGNIFICANT CHANGE UP (ref 98–110)
CO2 SERPL-SCNC: 25 MMOL/L — SIGNIFICANT CHANGE UP (ref 17–32)
CREAT SERPL-MCNC: 0.6 MG/DL — LOW (ref 0.7–1.5)
CREAT SERPL-MCNC: 0.7 MG/DL — SIGNIFICANT CHANGE UP (ref 0.7–1.5)
CULTURE RESULTS: ABNORMAL
EGFR: 83 ML/MIN/1.73M2 — SIGNIFICANT CHANGE UP
EGFR: 86 ML/MIN/1.73M2 — SIGNIFICANT CHANGE UP
EOSINOPHIL # BLD AUTO: 0.07 K/UL — SIGNIFICANT CHANGE UP (ref 0–0.7)
EOSINOPHIL NFR BLD AUTO: 1.5 % — SIGNIFICANT CHANGE UP (ref 0–8)
GLUCOSE SERPL-MCNC: 96 MG/DL — SIGNIFICANT CHANGE UP (ref 70–99)
HCT VFR BLD CALC: 37.2 % — SIGNIFICANT CHANGE UP (ref 37–47)
HGB BLD-MCNC: 11.7 G/DL — LOW (ref 12–16)
IMM GRANULOCYTES NFR BLD AUTO: 0.2 % — SIGNIFICANT CHANGE UP (ref 0.1–0.3)
LYMPHOCYTES # BLD AUTO: 1.13 K/UL — LOW (ref 1.2–3.4)
LYMPHOCYTES # BLD AUTO: 24.2 % — SIGNIFICANT CHANGE UP (ref 20.5–51.1)
MAGNESIUM SERPL-MCNC: 1.9 MG/DL — SIGNIFICANT CHANGE UP (ref 1.8–2.4)
MCHC RBC-ENTMCNC: 28 PG — SIGNIFICANT CHANGE UP (ref 27–31)
MCHC RBC-ENTMCNC: 31.5 G/DL — LOW (ref 32–37)
MCV RBC AUTO: 89 FL — SIGNIFICANT CHANGE UP (ref 81–99)
METHOD TYPE: SIGNIFICANT CHANGE UP
MONOCYTES # BLD AUTO: 0.79 K/UL — HIGH (ref 0.1–0.6)
MONOCYTES NFR BLD AUTO: 17 % — HIGH (ref 1.7–9.3)
NEUTROPHILS # BLD AUTO: 2.63 K/UL — SIGNIFICANT CHANGE UP (ref 1.4–6.5)
NEUTROPHILS NFR BLD AUTO: 56.5 % — SIGNIFICANT CHANGE UP (ref 42.2–75.2)
NRBC # BLD: 0 /100 WBCS — SIGNIFICANT CHANGE UP (ref 0–0)
ORGANISM # SPEC MICROSCOPIC CNT: ABNORMAL
ORGANISM # SPEC MICROSCOPIC CNT: SIGNIFICANT CHANGE UP
PLATELET # BLD AUTO: 166 K/UL — SIGNIFICANT CHANGE UP (ref 130–400)
PMV BLD: 10.7 FL — HIGH (ref 7.4–10.4)
POTASSIUM SERPL-MCNC: 3.7 MMOL/L — SIGNIFICANT CHANGE UP (ref 3.5–5)
POTASSIUM SERPL-SCNC: 3.7 MMOL/L — SIGNIFICANT CHANGE UP (ref 3.5–5)
PROT SERPL-MCNC: 5.5 G/DL — LOW (ref 6–8)
PROT SERPL-MCNC: 6.1 G/DL — SIGNIFICANT CHANGE UP (ref 6–8)
RBC # BLD: 4.18 M/UL — LOW (ref 4.2–5.4)
RBC # FLD: 13 % — SIGNIFICANT CHANGE UP (ref 11.5–14.5)
SODIUM SERPL-SCNC: 140 MMOL/L — SIGNIFICANT CHANGE UP (ref 135–146)
SPECIMEN SOURCE: SIGNIFICANT CHANGE UP
T4 FREE+ TSH PNL SERPL: 1.02 UIU/ML — SIGNIFICANT CHANGE UP (ref 0.27–4.2)
WBC # BLD: 4.66 K/UL — LOW (ref 4.8–10.8)
WBC # FLD AUTO: 4.66 K/UL — LOW (ref 4.8–10.8)

## 2024-02-13 PROCEDURE — 99232 SBSQ HOSP IP/OBS MODERATE 35: CPT

## 2024-02-13 RX ORDER — REMDESIVIR 5 MG/ML
100 INJECTION INTRAVENOUS EVERY 24 HOURS
Refills: 0 | Status: DISCONTINUED | OUTPATIENT
Start: 2024-02-13 | End: 2024-02-13

## 2024-02-13 RX ORDER — REMDESIVIR 5 MG/ML
100 INJECTION INTRAVENOUS EVERY 24 HOURS
Refills: 0 | Status: COMPLETED | OUTPATIENT
Start: 2024-02-13 | End: 2024-02-14

## 2024-02-13 RX ADMIN — ATORVASTATIN CALCIUM 40 MILLIGRAM(S): 80 TABLET, FILM COATED ORAL at 22:16

## 2024-02-13 RX ADMIN — SENNA PLUS 2 TABLET(S): 8.6 TABLET ORAL at 22:16

## 2024-02-13 RX ADMIN — Medication 60 MILLIGRAM(S): at 07:33

## 2024-02-13 RX ADMIN — ENOXAPARIN SODIUM 40 MILLIGRAM(S): 100 INJECTION SUBCUTANEOUS at 22:16

## 2024-02-13 RX ADMIN — REMDESIVIR 200 MILLIGRAM(S): 5 INJECTION INTRAVENOUS at 16:12

## 2024-02-13 NOTE — CONSULT NOTE ADULT - ASSESSMENT
89-year-old female past medical history of Dementia (AOx2 at baseline) and hyperlipidemia coming in here for recurrent falls. The son and daughter in law with whom the patient lives are at bedside. Patient reported feeling well, does not remember what happened or how she fell. She reported only cough productive of whitish sputum for the last week.     IMPRESSION/RECOMMENDATIONS  COVID-19 with moderate illness  On RA  CT with no overt GGO  No bacterial PNA  Pt is in the early viral replicative phase based on the timeline/onset of symptoms.  S/p vaccination Asymptomatic bacteuria with E coli. No pyelonephritis. No pyuria     mg iv on Day 1, then 100 mg iv D2 and D3.   No steroids  No other ABx    Please do not hesitate to recall ID if any questions arise either through [employer] or through [employer]

## 2024-02-13 NOTE — PROGRESS NOTE ADULT - SUBJECTIVE AND OBJECTIVE BOX
DANNIELLEMAMING  89y  Female      Patient is a 89y old  Female who presents with a chief complaint of fall (10 Feb 2024 11:59)      INTERVAL HPI/OVERNIGHT EVENTS:  Pt feels better today. She is no longer confused. She reports difficulty swallowing both liquids and solids which is new. She also reports throat pain.     Vital Signs Last 24 Hrs  T(C): 36.9 (11 Feb 2024 15:49), Max: 38.9 (10 Feb 2024 19:15)  T(F): 98.5 (11 Feb 2024 15:49), Max: 102 (10 Feb 2024 19:15)  HR: 80 (11 Feb 2024 15:49) (80 - 91)  BP: 142/75 (11 Feb 2024 15:49) (142/75 - 192/89)  BP(mean): 111 (10 Feb 2024 23:55) (111 - 111)  RR: 18 (11 Feb 2024 15:49) (18 - 20)  SpO2: 98% (11 Feb 2024 15:49) (95% - 98%)    Parameters below as of 11 Feb 2024 15:49  Patient On (Oxygen Delivery Method): room air      PHYSICAL EXAM:  GEN: No acute distress  LUNGS: Clear to auscultation bilaterally   HEART: S1/S2 present. RRR.   ABD/ GI: Soft, non-tender, non-distended. Bowel sounds present  EXT: No edema  NEURO: AAOX3    LABS                          11.8   5.83  )-----------( 135      ( 11 Feb 2024 07:04 )             37.6     02-11    140  |  107  |  19  ----------------------------<  84  4.0   |  20  |  0.9    Ca    8.9      11 Feb 2024 07:04    TPro  5.9<L>  /  Alb  3.5  /  TBili  0.2  /  DBili  x   /  AST  74<H>  /  ALT  56<H>  /  AlkPhos  120<H>  02-11        CARDIAC MARKERS ( 11 Feb 2024 07:04 )  x     / x     / 680 U/L / x     / x          
24H events:    Patient is a 89y old Female who presents with a chief complaint of fall (13 Feb 2024 08:19)    Primary diagnosis of Urinary tract infection        Today is hospital day 3d. This morning patient was seen and examined at bedside, resting comfortably in bed.    No acute or major events overnight.    Code Status:full      PAST MEDICAL & SURGICAL HISTORY  Dementia    Dyslipidemia    S/P cholecystectomy      SOCIAL HISTORY:  Social History:      ALLERGIES:  No Known Allergies    MEDICATIONS:  STANDING MEDICATIONS  atorvastatin 40 milliGRAM(s) Oral at bedtime  enoxaparin Injectable 40 milliGRAM(s) SubCutaneous every 24 hours  NIFEdipine XL 60 milliGRAM(s) Oral daily  remdesivir  IVPB 100 milliGRAM(s) IV Intermittent every 24 hours  senna 2 Tablet(s) Oral at bedtime    PRN MEDICATIONS    VITALS:   T(F): 97.9  HR: 88  BP: 153/69  RR: 17  SpO2: 95%    PHYSICAL EXAM:  GENERAL:   ( x ) NAD, lying in bed comfortably     (  ) obtunded     (  ) lethargic     (  ) somnolent    HEAD:   ( x ) Atraumatic     (  ) hematoma     (  ) laceration (specify location:       )     NECK:  ( x ) Supple     (  ) neck stiffness     (  ) nuchal rigidity     (  )  no JVD     (  ) JVD present ( -- cm)    HEART:  Rate -->     (  x) normal rate     (  ) bradycardic     (  ) tachycardic  Rhythm -->     (x  ) regular     (  ) regularly irregular     (  ) irregularly irregular  Murmurs -->     ( x ) normal s1s2     (  ) systolic murmur     (  ) diastolic murmur     (  ) continuous murmur      (  ) S3 present     (  ) S4 present    LUNGS:   (x  )Unlabored respirations     (  ) tachypnea  ( x ) B/L air entry     (  ) decreased breath sounds in:  (location     )    (x  ) no adventitious sound     (  ) crackles     (  ) wheezing      (  ) rhonchi      (specify location:       )  (  ) chest wall tenderness (specify location:       )    ABDOMEN:   ( x ) Soft     (  ) tense   |   (  ) nondistended     (  ) distended   |   (  ) +BS     (  ) hypoactive bowel sounds     (  ) hyperactive bowel sounds  ( x ) nontender     (  ) RUQ tenderness     (  ) RLQ tenderness     (  ) LLQ tenderness     (  ) epigastric tenderness     (  ) diffuse tenderness  (  ) Splenomegaly      (  ) Hepatomegaly      (  ) Jaundice     (  ) ecchymosis     EXTREMITIES:  (x ) Normal     (  ) Rash     (  ) ecchymosis     (  ) varicose veins      (  ) pitting edema     (  ) non-pitting edema   (  ) ulceration     (  ) gangrene:     (location:     )    NERVOUS SYSTEM:    ( x ) A&Ox3     (  ) confused     (  ) lethargic  CN II-XII:     (  x) Intact     (  ) deficits found     (Specify:     )   Upper extremities:     ( x ) no sensorimotor deficits     (  ) weakness     (  ) loss of proprioception/vibration     (  ) loss of touch/temperature (specify:    )  Lower extremities:     ( x ) no sensorimotor deficits     (  ) weakness     (  ) loss of proprioception/vibration     (  ) loss of touch/temperature (specify:    )      (  ) Indwelling Otero Catheter:   Date insterted:    Reason (  ) Critical illness     (  ) urinary retention    (  ) Accurate Ins/Outs Monitoring     (  ) CMO patient    (  ) Central Line:   Date inserted:  Location: (  ) Right IJ     (  ) Left IJ     (  ) Right Fem     (  ) Left Fem    (  ) SPC        (  ) pigtail       (  ) PEG tube       (  ) colostomy       (  ) jejunostomy  (  ) U-Dall    LABS:                        11.7   4.66  )-----------( 166      ( 13 Feb 2024 05:45 )             37.2     02-13    140  |  104  |  16  ----------------------------<  96  3.7   |  25  |  0.6<L>    Ca    8.6      13 Feb 2024 05:45  Mg     1.9     02-13    TPro  5.5<L>  /  Alb  3.4<L>  /  TBili  <0.2  /  DBili  x   /  AST  39  /  ALT  37  /  AlkPhos  113  02-13      Urinalysis Basic - ( 13 Feb 2024 05:45 )    Color: x / Appearance: x / SG: x / pH: x  Gluc: 96 mg/dL / Ketone: x  / Bili: x / Urobili: x   Blood: x / Protein: x / Nitrite: x   Leuk Esterase: x / RBC: x / WBC x   Sq Epi: x / Non Sq Epi: x / Bacteria: x              89-year-old female past medical history of Dementia (AOx2 at baseline) and hyperlipidemia coming in here for recurrent falls. The son and daughter in law with whom the patient lives are at bedside. Patient reported feeling well, does not remember what happened or how she fell. She reported only cough productive of whitish sputum for the last week.     Impression:    Recurrent falls with deconditioning; ?Dementia  asymptomatic UTI with CT showing RLL opacity  Covid-19 on RA  New onset difficulty swallowing : regular diet  Transaminiti  Thyroid nodules- outpt follow up  HTN    Plan:  PT eval, uses cane at baseline; fall precautions; Trauma w/u neg, home with HCS for PT  OFF antibiotics, c/w remdesevir day 2  Bcx -ve, Ucx: E.coli  Outpt thyroid ultrasound  Resume home meds  SLP eval; Aspiration precautions.    procardia 60 XL for HTN      diet: Dash  DVT: enoxaparin  pending: completion of remdesivir, anticipate in  48 hrs              
Patient is a 89y old  Female who presents with a chief complaint of fall (02-11-24)      Pt seen and examined at bedside. No CP or SOB.        PAST MEDICAL & SURGICAL HISTORY:  Dementia    Dyslipidemia    S/P cholecystectomy      VITAL SIGNS (Last 24 hrs):  T(C): 37 (02-12-24 @ 07:49), Max: 37 (02-12-24 @ 07:49)  HR: 86 (02-12-24 @ 07:49) (73 - 86)  BP: 189/98 (02-12-24 @ 07:49) (142/75 - 189/98)  RR: 18 (02-12-24 @ 07:49) (18 - 18)  SpO2: 96% (02-12-24 @ 07:49) (96% - 98%)  Wt(kg): --  Daily     Daily     I&O's Summary      PHYSICAL EXAM:  GENERAL: NAD  HEAD:  Atraumatic, Normocephalic  EYES: EOMI, PERRLA, conjunctiva and sclera clear  NECK: Supple, No JVD  CHEST/LUNG: Clear to auscultation bilaterally; No wheeze  HEART: Regular rate and rhythm; No murmurs, rubs, or gallops  ABDOMEN: Soft, Nontender, Nondistended; Bowel sounds present  EXTREMITIES:  2+ Peripheral Pulses, No clubbing, cyanosis, or edema  PSYCH: AAOx3  NEUROLOGY: non-focal  SKIN: No rashes or lesions    Labs Reviewed  Spoke to patient in regards to abnormal labs.    CBC Full  -  ( 12 Feb 2024 09:23 )  WBC Count : 4.81 K/uL  Hemoglobin : 13.5 g/dL  Hematocrit : 43.6 %  Platelet Count - Automated : 158 K/uL  Mean Cell Volume : 89.5 fL  Mean Cell Hemoglobin : 27.7 pg  Mean Cell Hemoglobin Concentration : 31.0 g/dL  Auto Neutrophil # : 2.75 K/uL  Auto Lymphocyte # : 1.24 K/uL  Auto Monocyte # : 0.73 K/uL  Auto Eosinophil # : 0.05 K/uL  Auto Basophil # : 0.03 K/uL  Auto Neutrophil % : 57.2 %  Auto Lymphocyte % : 25.8 %  Auto Monocyte % : 15.2 %  Auto Eosinophil % : 1.0 %  Auto Basophil % : 0.6 %    BMP:    02-12 @ 09:23    Blood Urea Nitrogen - 18  Calcium - 9.5  Carbond Dioxide - 23  Chloride - 104  Creatinine - 0.7  Glucose - 96  Potassium - 4.5  Sodium - 141      Hemoglobin A1c -     Urine Culture:  02-10 @ 08:00 Urine culture: --    Culture Results:   No growth at 24 hours  Method Type: --  Organism: --  Organism Identification: --  Specimen Source: .Blood Blood-Peripheral  02-10 @ 07:40 Urine culture: --    Culture Results:   >100,000 CFU/ml Escherichia coli  Method Type: --  Organism: --  Organism Identification: --  Specimen Source: Clean Catch Clean Catch (Midstream)        COVID Labs  CRP:      D-Dimer:            Imaging reviewed independently and reviewed read  < from: CT Abdomen and Pelvis w/ IV Cont (02.10.24 @ 07:45) >  IMPRESSION:    Trace subcapsular splenic fluid, age indeterminate andof indeterminate   etiology. Cannot exclude acute traumatic injury. Clinical correlation   recommended.    Indeterminate irregular right lower lobe pulmonary opacity with   surrounding groundglass, measuring up to 1.8 x 1.1 cm. While   infectious/inflammatory etiology is on differential, cannot exclude   neoplastic etiology given morphology. Recommend correlation with prior   imaging if available and further workup/short-term follow-up CT or PET/CT   evaluation.    Thyroid nodules. Recommend follow with outpatient sonography.    Additional findings in the body of the report.    < end of copied text >        MEDICATIONS  (STANDING):  atorvastatin 40 milliGRAM(s) Oral at bedtime  azithromycin  IVPB 500 milliGRAM(s) IV Intermittent every 24 hours  azithromycin  IVPB      cefTRIAXone   IVPB 1000 milliGRAM(s) IV Intermittent every 24 hours  enoxaparin Injectable 40 milliGRAM(s) SubCutaneous every 24 hours  NIFEdipine XL 60 milliGRAM(s) Oral daily  remdesivir  IVPB 200 milliGRAM(s) IV Intermittent every 24 hours  remdesivir  IVPB   IV Intermittent   senna 2 Tablet(s) Oral at bedtime    MEDICATIONS  (PRN):

## 2024-02-13 NOTE — PATIENT PROFILE ADULT - DIETITIAN.
Total Joint Month 3 Survey      Responses   Facility patient discharged from?  [facility]   Does the patient have one of the following disease processes/diagnoses(primary or secondary)?  Total Joint Replacement   Joint surgery performed?  Shoulder   Month 3 attempt successful?  No   Unsuccessful attempts  Attempt 1          Renee Louise RN         
dietitian/nutrition services

## 2024-02-13 NOTE — PROGRESS NOTE ADULT - ATTENDING COMMENTS
89-year-old female past medical history of Dementia (AOx2 at baseline) and hyperlipidemia coming in here for recurrent falls. The son and daughter in law with whom the patient lives are at bedside. Patient reported feeling well, does not remember what happened or how she fell. She reported only cough productive of whitish sputum for the last week.          Recurrent falls with deconditioning   asymptomatic bacteriuria   Covid-19 on RA  PNA ruled out   Transaminitis   Thyroid nodules- outpt follow up  HTN    Plan:  PT eval, uses cane at baseline; fall precautions; Trauma w/u neg, home with HCS for PT  OFF antibiotics, c/w remdesevir day 2/3   Bcx -ve, Ucx: E.coli  Outpt thyroid ultrasound  c/w home meds  SLP eval; Aspiration precautions.     DVT: enoxaparin     Pending: PT eval, day 2/3 RDV, anticipate for discharge tomorrow   Plan of care d/w daughter   Dispo: STR vs Home depending on PT eval, family agreed for STR if needed

## 2024-02-13 NOTE — PATIENT PROFILE ADULT - FALL HARM RISK - HARM RISK INTERVENTIONS

## 2024-02-13 NOTE — CONSULT NOTE ADULT - SUBJECTIVE AND OBJECTIVE BOX
MING BAUTISTA  89y, Female  Allergy: No Known Allergies      All historical available data reviewed.    HPI:  89-year-old female past medical history of Dementia (AOx2 at baseline) and hyperlipidemia coming in here for recurrent falls. The son and daughter in law with whom the patient lives are at bedside. Patient reported feeling well, does not remember what happened or how she fell. She reported only cough productive of whitish sputum for the last week. Acc to daughter in law pt first had an unwitnessed fall last week where they heard her fall but did not see how it happened. No LOC. Today at 4 am in the morning they heard her fall again in her room. They found her in the floor wet with her pants off. She was trying to change her clothes and tripped and fell. No LOC, +ve head trauma. Acc to family pt is oriented to person and place not to time at baseline. Sometimes she wakes up confused at night and wanders inside the house. This also happened this week. The urinary incontinence is not new, it has been going on for 6 months now. She uses a cane usually, but this week thy noticed she is more weak and shaking when walking. No fever, chills, abdominal pain, N&V, dysuria, sore throat, SOB, chest pain, focal weakness, headache, blurry visio, slurred speech.     Vitals in ED: T 99.1, HR 95, /75, spo2 99% on RA   Pt had fever later in .5   Labs unremarkable except for mildly elevated LFTs   UA : WBC 10, too many bacteria, +ve nitrite, small LE   CT HEAD:  No acute intracranial findings.  Severe chronic microvascular ischemic changes.  CT CERVICAL SPINE:  No acute cervical fracture or facet subluxation.  CT Chest/A/P w IV con:   Trace subcapsular splenic fluid, age indeterminate and of indeterminate   etiology. Cannot exclude acute traumatic injury. Clinical correlation   recommended.  Indeterminate irregular right lower lobe pulmonary opacity with   surrounding groundglass, measuring up to 1.8 x 1.1 cm. While   infectious/inflammatory etiology is on differential, cannot exclude   neoplastic etiology given morphology. Recommend correlation with prior   imaging if available and further workup/short-term follow-up CT or PET/CT   evaluation.  Thyroid nodules. Recommend follow with outpatient sonography.    Patient admitted for workup of recurrent falls.  (10 Feb 2024 11:59)    FAMILY HISTORY:  No pertinent family history in first degree relatives      PAST MEDICAL & SURGICAL HISTORY:  Dementia      Dyslipidemia      S/P cholecystectomy            VITALS:  T(F): 97.9, Max: 98.2 (02-12-24 @ 19:12)  HR: 88  BP: 153/69  RR: 18Vital Signs Last 24 Hrs  T(C): 36.6 (13 Feb 2024 00:30), Max: 36.8 (12 Feb 2024 19:12)  T(F): 97.9 (13 Feb 2024 00:30), Max: 98.2 (12 Feb 2024 19:12)  HR: 88 (13 Feb 2024 00:30) (76 - 88)  BP: 153/69 (13 Feb 2024 00:30) (127/67 - 153/69)  BP(mean): --  RR: 18 (13 Feb 2024 00:30) (18 - 19)  SpO2: 94% (12 Feb 2024 21:08) (94% - 98%)    Parameters below as of 13 Feb 2024 00:30  Patient On (Oxygen Delivery Method): room air        TESTS & MEASUREMENTS:                        11.7   4.66  )-----------( 166      ( 13 Feb 2024 05:45 )             37.2     02-13    140  |  104  |  16  ----------------------------<  96  3.7   |  25  |  0.6<L>    Ca    8.6      13 Feb 2024 05:45  Mg     1.9     02-13    TPro  5.5<L>  /  Alb  3.4<L>  /  TBili  <0.2  /  DBili  x   /  AST  39  /  ALT  37  /  AlkPhos  113  02-13    LIVER FUNCTIONS - ( 13 Feb 2024 05:45 )  Alb: 3.4 g/dL / Pro: 5.5 g/dL / ALK PHOS: 113 U/L / ALT: 37 U/L / AST: 39 U/L / GGT: x             Culture - Blood (collected 02-10-24 @ 08:00)  Source: .Blood Blood-Peripheral  Preliminary Report (02-12-24 @ 17:02):    No growth at 48 Hours    Culture - Blood (collected 02-10-24 @ 08:00)  Source: .Blood Blood-Peripheral  Preliminary Report (02-12-24 @ 17:02):    No growth at 48 Hours    Culture - Urine (collected 02-10-24 @ 07:40)  Source: Clean Catch Clean Catch (Midstream)  Preliminary Report (02-11-24 @ 17:14):    >100,000 CFU/ml Escherichia coli      Urinalysis Basic - ( 13 Feb 2024 05:45 )    Color: x / Appearance: x / SG: x / pH: x  Gluc: 96 mg/dL / Ketone: x  / Bili: x / Urobili: x   Blood: x / Protein: x / Nitrite: x   Leuk Esterase: x / RBC: x / WBC x   Sq Epi: x / Non Sq Epi: x / Bacteria: x          RADIOLOGY & ADDITIONAL TESTS:  Personal review of radiological diagnostics performed  Echo and EKG results noted when applicable.     MEDICATIONS:  atorvastatin 40 milliGRAM(s) Oral at bedtime  cefTRIAXone   IVPB 1000 milliGRAM(s) IV Intermittent every 24 hours  enoxaparin Injectable 40 milliGRAM(s) SubCutaneous every 24 hours  NIFEdipine XL 60 milliGRAM(s) Oral daily  remdesivir  IVPB 100 milliGRAM(s) IV Intermittent every 24 hours  remdesivir  IVPB   IV Intermittent   senna 2 Tablet(s) Oral at bedtime      ANTIBIOTICS:  cefTRIAXone   IVPB 1000 milliGRAM(s) IV Intermittent every 24 hours  remdesivir  IVPB 100 milliGRAM(s) IV Intermittent every 24 hours  remdesivir  IVPB   IV Intermittent

## 2024-02-14 ENCOUNTER — TRANSCRIPTION ENCOUNTER (OUTPATIENT)
Age: 89
End: 2024-02-14

## 2024-02-14 VITALS
DIASTOLIC BLOOD PRESSURE: 72 MMHG | TEMPERATURE: 99 F | SYSTOLIC BLOOD PRESSURE: 117 MMHG | RESPIRATION RATE: 18 BRPM | HEART RATE: 84 BPM

## 2024-02-14 LAB
ALBUMIN SERPL ELPH-MCNC: 3.4 G/DL — LOW (ref 3.5–5.2)
ALBUMIN SERPL ELPH-MCNC: 3.4 G/DL — LOW (ref 3.5–5.2)
ALP SERPL-CCNC: 110 U/L — SIGNIFICANT CHANGE UP (ref 30–115)
ALP SERPL-CCNC: 113 U/L — SIGNIFICANT CHANGE UP (ref 30–115)
ALT FLD-CCNC: 27 U/L — SIGNIFICANT CHANGE UP (ref 0–41)
ALT FLD-CCNC: 29 U/L — SIGNIFICANT CHANGE UP (ref 0–41)
ANION GAP SERPL CALC-SCNC: 15 MMOL/L — HIGH (ref 7–14)
AST SERPL-CCNC: 26 U/L — SIGNIFICANT CHANGE UP (ref 0–41)
AST SERPL-CCNC: 28 U/L — SIGNIFICANT CHANGE UP (ref 0–41)
BASOPHILS # BLD AUTO: 0.03 K/UL — SIGNIFICANT CHANGE UP (ref 0–0.2)
BASOPHILS NFR BLD AUTO: 0.6 % — SIGNIFICANT CHANGE UP (ref 0–1)
BILIRUB DIRECT SERPL-MCNC: <0.2 MG/DL — SIGNIFICANT CHANGE UP (ref 0–0.3)
BILIRUB INDIRECT FLD-MCNC: >0 MG/DL — LOW (ref 0.2–1.2)
BILIRUB SERPL-MCNC: 0.2 MG/DL — SIGNIFICANT CHANGE UP (ref 0.2–1.2)
BILIRUB SERPL-MCNC: 0.2 MG/DL — SIGNIFICANT CHANGE UP (ref 0.2–1.2)
BUN SERPL-MCNC: 17 MG/DL — SIGNIFICANT CHANGE UP (ref 10–20)
CALCIUM SERPL-MCNC: 8.8 MG/DL — SIGNIFICANT CHANGE UP (ref 8.4–10.5)
CHLORIDE SERPL-SCNC: 103 MMOL/L — SIGNIFICANT CHANGE UP (ref 98–110)
CO2 SERPL-SCNC: 23 MMOL/L — SIGNIFICANT CHANGE UP (ref 17–32)
CREAT SERPL-MCNC: 0.7 MG/DL — SIGNIFICANT CHANGE UP (ref 0.7–1.5)
EGFR: 83 ML/MIN/1.73M2 — SIGNIFICANT CHANGE UP
EOSINOPHIL # BLD AUTO: 0.1 K/UL — SIGNIFICANT CHANGE UP (ref 0–0.7)
EOSINOPHIL NFR BLD AUTO: 1.9 % — SIGNIFICANT CHANGE UP (ref 0–8)
GLUCOSE SERPL-MCNC: 87 MG/DL — SIGNIFICANT CHANGE UP (ref 70–99)
HCT VFR BLD CALC: 39 % — SIGNIFICANT CHANGE UP (ref 37–47)
HGB BLD-MCNC: 12.1 G/DL — SIGNIFICANT CHANGE UP (ref 12–16)
IMM GRANULOCYTES NFR BLD AUTO: 0.4 % — HIGH (ref 0.1–0.3)
LYMPHOCYTES # BLD AUTO: 1.15 K/UL — LOW (ref 1.2–3.4)
LYMPHOCYTES # BLD AUTO: 21.8 % — SIGNIFICANT CHANGE UP (ref 20.5–51.1)
MAGNESIUM SERPL-MCNC: 2.1 MG/DL — SIGNIFICANT CHANGE UP (ref 1.8–2.4)
MCHC RBC-ENTMCNC: 27.6 PG — SIGNIFICANT CHANGE UP (ref 27–31)
MCHC RBC-ENTMCNC: 31 G/DL — LOW (ref 32–37)
MCV RBC AUTO: 88.8 FL — SIGNIFICANT CHANGE UP (ref 81–99)
MONOCYTES # BLD AUTO: 0.64 K/UL — HIGH (ref 0.1–0.6)
MONOCYTES NFR BLD AUTO: 12.1 % — HIGH (ref 1.7–9.3)
NEUTROPHILS # BLD AUTO: 3.34 K/UL — SIGNIFICANT CHANGE UP (ref 1.4–6.5)
NEUTROPHILS NFR BLD AUTO: 63.2 % — SIGNIFICANT CHANGE UP (ref 42.2–75.2)
NRBC # BLD: 0 /100 WBCS — SIGNIFICANT CHANGE UP (ref 0–0)
PLATELET # BLD AUTO: 182 K/UL — SIGNIFICANT CHANGE UP (ref 130–400)
PMV BLD: 11 FL — HIGH (ref 7.4–10.4)
POTASSIUM SERPL-MCNC: 4 MMOL/L — SIGNIFICANT CHANGE UP (ref 3.5–5)
POTASSIUM SERPL-SCNC: 4 MMOL/L — SIGNIFICANT CHANGE UP (ref 3.5–5)
PROT SERPL-MCNC: 5.7 G/DL — LOW (ref 6–8)
PROT SERPL-MCNC: 5.8 G/DL — LOW (ref 6–8)
RBC # BLD: 4.39 M/UL — SIGNIFICANT CHANGE UP (ref 4.2–5.4)
RBC # FLD: 12.9 % — SIGNIFICANT CHANGE UP (ref 11.5–14.5)
SODIUM SERPL-SCNC: 141 MMOL/L — SIGNIFICANT CHANGE UP (ref 135–146)
WBC # BLD: 5.28 K/UL — SIGNIFICANT CHANGE UP (ref 4.8–10.8)
WBC # FLD AUTO: 5.28 K/UL — SIGNIFICANT CHANGE UP (ref 4.8–10.8)

## 2024-02-14 PROCEDURE — 99239 HOSP IP/OBS DSCHRG MGMT >30: CPT

## 2024-02-14 RX ORDER — NIFEDIPINE 30 MG
1 TABLET, EXTENDED RELEASE 24 HR ORAL
Qty: 30 | Refills: 0
Start: 2024-02-14 | End: 2024-03-14

## 2024-02-14 RX ADMIN — Medication 60 MILLIGRAM(S): at 06:01

## 2024-02-14 RX ADMIN — REMDESIVIR 200 MILLIGRAM(S): 5 INJECTION INTRAVENOUS at 12:38

## 2024-02-14 NOTE — DISCHARGE NOTE PROVIDER - HOSPITAL COURSE
89-year-old female past medical history of Dementia (AOx2 at baseline) and hyperlipidemia coming in here for recurrent falls. The son and daughter in law with whom the patient lives are at bedside. Patient reported feeling well, does not remember what happened or how she fell. She reported only cough productive of whitish sputum for the last week. Admitted to floor for physical therapy, found to have asymptomatic covid, ID consulted, s/p remdesevir, hemodynamically stable for discharge.         Recurrent falls with deconditioning   asymptomatic bacteriuria   Covid-19 on RA  PNA ruled out   Transaminitis   Thyroid nodules- outpt follow up  HTN    Plan:  PT eval, uses cane at baseline; fall precautions; Trauma w/u neg, home with HCS for PT vs DOLLY  OFF antibiotics, c/w remdesevir day 2/3   Bcx -ve, Ucx: E.coli  Outpt thyroid ultrasound  c/w home meds  SLP eval; Aspiration precautions.     DVT: enoxaparin          89-year-old female past medical history of Dementia (AOx2 at baseline) and hyperlipidemia coming in here for recurrent falls. The son and daughter in law with whom the patient lives are at bedside. Patient reported feeling well, does not remember what happened or how she fell. She reported only cough productive of whitish sputum for the last week. Admitted to floor for physical therapy, found to have asymptomatic covid, ID consulted, s/p remdesevir, hemodynamically stable for discharge.         Recurrent falls with deconditioning   asymptomatic bacteriuria   Covid-19 on RA  PNA ruled out   Transaminitis   Thyroid nodules- outpt follow up  HTN    Plan:  PT eval, uses cane at baseline; fall precautions; Trauma w/u neg, home with assistance for PT vs DOLLY  OFF antibiotics, c/w remdesevir day 2/3   Bcx -ve, Ucx: E.coli  Outpt thyroid ultrasound  c/w home meds  SLP eval; Aspiration precautions.  started on nifedipine xl 60 mg daily for elevated BP. discharged home on nifedipine xl 30 mg daily.    DVT: enoxaparin

## 2024-02-14 NOTE — DISCHARGE NOTE PROVIDER - NSDCMRMEDTOKEN_GEN_ALL_CORE_FT
simvastatin 20 mg oral tablet: 1 tab(s) orally once a day   NIFEdipine 30 mg oral tablet, extended release: 1 tab(s) orally once a day  simvastatin 20 mg oral tablet: 1 tab(s) orally once a day

## 2024-02-14 NOTE — DISCHARGE NOTE PROVIDER - NSDCCPCAREPLAN_GEN_ALL_CORE_FT
PRINCIPAL DISCHARGE DIAGNOSIS  Diagnosis: Fall at home  Assessment and Plan of Treatment: You were evaluated in the hospital for your recurrent falls at home, trauma work up all negative, also found to have asymptomatic covid, as per Infectious disease recommendation gave 3 day course of remdesevir, doesnot require any oxygen, mental status at baseline on discharge. Your Blood pressure was consistenly elevated for which you will be discharged on Nefidipine.  PT consulted, going to .....  After discharge, you will need to:   - Follow up with your primary care doctor within 1-2 weeks  - Take all the medications you were discharged with, unless otherwise instructed by your healthcare provider(s).   Please follow up with your providers by calling them to make an appointment so that you can see them in 1-2weeks; bring your paperwork from this hospital stay to that visit. You can access your visit information by signing up for an account for the patient portal at [email or web address]   Seek immediate medical attention if you develop fevers, chills, chest pain, shortness of breath, nausea and vomiting, abdominal pain, passing out, weakness or numbness or tingling on one side of your body, or any other concerning signs or symptoms.        SECONDARY DISCHARGE DIAGNOSES  Diagnosis: Fever  Assessment and Plan of Treatment:     Diagnosis: Fall  Assessment and Plan of Treatment:      PRINCIPAL DISCHARGE DIAGNOSIS  Diagnosis: Fall at home  Assessment and Plan of Treatment: You were evaluated in the hospital for your recurrent falls at home, trauma work up all negative, also found to have asymptomatic covid, as per Infectious disease recommendation gave 3 day course of remdesevir, doesnot require any oxygen, mental status at baseline on discharge. Your Blood pressure was consistenly elevated for which you will be discharged on Nefidipine.  PT consulted, going to .....  After discharge, you will need to:   - Follow up with your primary care doctor within 1-2 weeks  - Take all the medications you were discharged with, unless otherwise instructed by your healthcare provider(s).   Please follow up with your providers by calling them to make an appointment so that you can see them in 1-2weeks; bring your paperwork from this hospital stay to that visit. You can access your visit information by signing up for an account for the patient portal at [email or web address]   Seek immediate medical attention if you develop fevers, chills, chest pain, shortness of breath, nausea and vomiting, abdominal pain, passing out, weakness or numbness or tingling on one side of your body, or any other concerning signs or symptoms.        SECONDARY DISCHARGE DIAGNOSES  Diagnosis: HTN (hypertension)  Assessment and Plan of Treatment: you were started on nifedipine xl 60 mg daily for elevated BP. BP improved in the hospital. you are being discharged home on nifedipine xl 30 mg daily.   Please take your medications as prescribed, check your BP at least once a day and document BP measurements; and follow up with your PMD in 1-2 weeks after discharge.    Diagnosis: 2019 novel coronavirus disease (COVID-19)  Assessment and Plan of Treatment: Found to have COVID-19 with moderate illness. seen by infectious disease. Treated with 3 day course of Remdesivir.     PRINCIPAL DISCHARGE DIAGNOSIS  Diagnosis: Fall at home  Assessment and Plan of Treatment: You were evaluated in the hospital for your recurrent falls at home, trauma work up all negative, also found to have asymptomatic covid, as per Infectious disease recommendation gave 3 day course of remdesevir, doesnot require any oxygen, mental status at baseline on discharge. Your Blood pressure was consistenly elevated for which you will be discharged on Nefidipine. After discharge, you will need to:   - Follow up with your primary care doctor within 1-2 weeks  - Take all the medications you were discharged with, unless otherwise instructed by your healthcare provider(s).   Please follow up with your providers by calling them to make an appointment so that you can see them in 1-2weeks; bring your paperwork from this hospital stay to that visit. You can access your visit information by signing up for an account for the patient portal at [email or web address]   Seek immediate medical attention if you develop fevers, chills, chest pain, shortness of breath, nausea and vomiting, abdominal pain, passing out, weakness or numbness or tingling on one side of your body, or any other concerning signs or symptoms.        SECONDARY DISCHARGE DIAGNOSES  Diagnosis: HTN (hypertension)  Assessment and Plan of Treatment: you were started on nifedipine xl 60 mg daily for elevated BP. BP improved in the hospital. you are being discharged home on nifedipine xl 30 mg daily.   Please take your medications as prescribed, check your BP at least once a day and document BP measurements; and follow up with your PMD in 1-2 weeks after discharge.    Diagnosis: 2019 novel coronavirus disease (COVID-19)  Assessment and Plan of Treatment: Found to have COVID-19 with moderate illness. seen by infectious disease. Treated with 3 day course of Remdesivir.

## 2024-02-14 NOTE — DISCHARGE NOTE PROVIDER - CARE PROVIDER_API CALL
Ashwini Alberto  Geriatric Medicine  [address] [place] [id number]  Phone: [phone]  Fax: [phone]  Follow Up Time: 1 month

## 2024-02-14 NOTE — DISCHARGE NOTE PROVIDER - ATTENDING DISCHARGE PHYSICAL EXAMINATION:
VITALS:  Vital Signs Last 24 Hrs  T(C): 37.1 (14 Feb 2024 12:41), Max: 37.7 (14 Feb 2024 05:23)  T(F): 98.8 (14 Feb 2024 12:41), Max: 99.8 (14 Feb 2024 05:23)  HR: 84 (14 Feb 2024 12:41) (75 - 84)  BP: 117/72 (14 Feb 2024 12:41) (117/72 - 164/74)  RR: 18 (14 Feb 2024 12:41) (18 - 18)        PHYSICAL EXAM:  GENERAL: NAD, well-developed  CHEST/LUNG: CTAB; No wheeze  HEART: RRR; No murmurs, rubs, or gallops  ABDOMEN: Soft, NT/ND; BS present  EXTREMITIES:  No cyanosis, or edema  NEUROLOGY: AAOx2  SKIN: No rashes or lesions on visible areas

## 2024-02-15 LAB
CULTURE RESULTS: SIGNIFICANT CHANGE UP
CULTURE RESULTS: SIGNIFICANT CHANGE UP
SPECIMEN SOURCE: SIGNIFICANT CHANGE UP
SPECIMEN SOURCE: SIGNIFICANT CHANGE UP

## 2024-02-23 DIAGNOSIS — F03.90 UNSPECIFIED DEMENTIA WITHOUT BEHAVIORAL DISTURBANCE: ICD-10-CM

## 2024-02-23 DIAGNOSIS — I10 ESSENTIAL (PRIMARY) HYPERTENSION: ICD-10-CM

## 2024-02-23 DIAGNOSIS — B96.20 UNSPECIFIED ESCHERICHIA COLI [E. COLI] AS THE CAUSE OF DISEASES CLASSIFIED ELSEWHERE: ICD-10-CM

## 2024-02-23 DIAGNOSIS — Z91.81 HISTORY OF FALLING: ICD-10-CM

## 2024-02-23 DIAGNOSIS — U07.1 COVID-19: ICD-10-CM

## 2024-02-23 DIAGNOSIS — N39.0 URINARY TRACT INFECTION, SITE NOT SPECIFIED: ICD-10-CM

## 2024-02-23 DIAGNOSIS — R29.6 REPEATED FALLS: ICD-10-CM

## 2024-02-23 DIAGNOSIS — Z66 DO NOT RESUSCITATE: ICD-10-CM

## 2024-02-23 DIAGNOSIS — E04.2 NONTOXIC MULTINODULAR GOITER: ICD-10-CM

## 2024-02-23 DIAGNOSIS — R74.01 ELEVATION OF LEVELS OF LIVER TRANSAMINASE LEVELS: ICD-10-CM

## 2024-03-04 PROBLEM — Z00.00 ENCOUNTER FOR PREVENTIVE HEALTH EXAMINATION: Status: ACTIVE | Noted: 2024-03-04

## 2024-03-04 PROBLEM — E78.5 HYPERLIPIDEMIA, UNSPECIFIED: Chronic | Status: ACTIVE | Noted: 2024-02-10

## 2024-03-04 PROBLEM — F03.90 UNSPECIFIED DEMENTIA WITHOUT BEHAVIORAL DISTURBANCE: Chronic | Status: ACTIVE | Noted: 2024-02-10

## 2024-03-11 ENCOUNTER — APPOINTMENT (OUTPATIENT)
Dept: HOME HEALTH SERVICES | Facility: HOME HEALTH | Age: 89
End: 2024-03-11
Payer: MEDICARE

## 2024-03-11 ENCOUNTER — APPOINTMENT (OUTPATIENT)
Dept: HOME HEALTH SERVICES | Facility: HOME HEALTH | Age: 89
End: 2024-03-11

## 2024-03-11 VITALS
HEART RATE: 75 BPM | TEMPERATURE: 97.2 F | SYSTOLIC BLOOD PRESSURE: 120 MMHG | OXYGEN SATURATION: 97 % | WEIGHT: 140 LBS | HEIGHT: 64 IN | DIASTOLIC BLOOD PRESSURE: 72 MMHG | BODY MASS INDEX: 23.9 KG/M2

## 2024-03-11 DIAGNOSIS — F03.90 UNSPECIFIED DEMENTIA W/OUT BEHAVIORAL DISTURBANCE: ICD-10-CM

## 2024-03-11 DIAGNOSIS — I10 ESSENTIAL (PRIMARY) HYPERTENSION: ICD-10-CM

## 2024-03-11 DIAGNOSIS — Z71.89 OTHER SPECIFIED COUNSELING: ICD-10-CM

## 2024-03-11 DIAGNOSIS — E78.5 HYPERLIPIDEMIA, UNSPECIFIED: ICD-10-CM

## 2024-03-11 DIAGNOSIS — Z23 ENCOUNTER FOR IMMUNIZATION: ICD-10-CM

## 2024-03-11 DIAGNOSIS — Z74.09 OTHER REDUCED MOBILITY: ICD-10-CM

## 2024-03-11 DIAGNOSIS — Z91.81 HISTORY OF FALLING: ICD-10-CM

## 2024-03-11 PROCEDURE — 99344 HOME/RES VST NEW MOD MDM 60: CPT

## 2024-03-11 PROCEDURE — 99497 ADVNCD CARE PLAN 30 MIN: CPT

## 2024-03-12 PROBLEM — I10 HTN (HYPERTENSION): Status: ACTIVE | Noted: 2024-03-11

## 2024-03-12 PROBLEM — Z71.89 ADVANCED CARE PLANNING/COUNSELING DISCUSSION: Status: ACTIVE | Noted: 2024-03-12

## 2024-03-12 PROBLEM — Z74.09 IMPAIRED MOBILITY: Status: ACTIVE | Noted: 2024-03-11

## 2024-03-12 PROBLEM — F03.90 DEMENTIA: Status: ACTIVE | Noted: 2024-03-12

## 2024-03-12 PROBLEM — E78.5 DYSLIPIDEMIA: Status: ACTIVE | Noted: 2024-03-11

## 2024-03-12 NOTE — REASON FOR VISIT
[Initial Evaluation] : an initial evaluation [Pre-Visit Preparation] : pre-visit preparation was done [FreeTextEntry1] : PMH: HTN, HLD and impaired mobility  [FreeTextEntry2] : chart review

## 2024-03-12 NOTE — HISTORY OF PRESENT ILLNESS
[Patient] : patient [Family Member] : family member [LastPVisitDate] : 02/2024 [FreeTextEntry2] : 03/11/2024 COVID SCREEN: Patient or caregiver denies fever, cough, trouble breathing, rash or contact with someone who tested positive for COVID-19.   N95 mask, gloves, eye wear and gown (if indicated) used during visit: YES  Total face to face time with patient is 60 min  David Jennifer, 89 year old female with HTN, HLD, dementia and impaired mobility presents for initial in-home visit and evaluation of medical conditions.    History provided by patient and daughter in law Janiya (HCP)   Today patient reports feeling well, denies dizziness, dysuria, constipation, SOB, chest pain or MARIA LUZ.  Patient with impaired mobility, as per daughter in law patient refusing to use shower chair, patient and family educated on importance of use and risks for fall/injury.    patient's caregiver reports no weight loss >10 lbs in the past 6 months. No changes in dentition or swallowing reported, No changes in hearing or vision reported. No changes in Cognition reported. Patient denies any symptoms of depression or anxiety. Patient is ADL independent and IADL dependent.   Patient's home environment is safe.     [FreeTextEntry1] : impaired mobility

## 2024-05-20 ENCOUNTER — APPOINTMENT (OUTPATIENT)
Dept: HOME HEALTH SERVICES | Facility: HOME HEALTH | Age: 89
End: 2024-05-20

## 2024-05-20 VITALS
TEMPERATURE: 97 F | DIASTOLIC BLOOD PRESSURE: 76 MMHG | RESPIRATION RATE: 18 BRPM | OXYGEN SATURATION: 98 % | SYSTOLIC BLOOD PRESSURE: 118 MMHG | HEART RATE: 76 BPM

## 2024-05-20 DIAGNOSIS — R35.0 FREQUENCY OF MICTURITION: ICD-10-CM

## 2024-05-20 RX ORDER — SIMVASTATIN 20 MG/1
20 TABLET, FILM COATED ORAL
Qty: 90 | Refills: 0 | Status: ACTIVE | COMMUNITY
Start: 2024-03-11

## 2024-05-20 RX ORDER — NIFEDIPINE 30 MG/1
30 TABLET, EXTENDED RELEASE ORAL DAILY
Qty: 90 | Refills: 0 | Status: ACTIVE | COMMUNITY
Start: 2024-03-11

## 2024-05-22 ENCOUNTER — LABORATORY RESULT (OUTPATIENT)
Age: 89
End: 2024-05-22

## 2024-05-22 ENCOUNTER — NON-APPOINTMENT (OUTPATIENT)
Age: 89
End: 2024-05-22

## 2024-05-28 DIAGNOSIS — N39.0 URINARY TRACT INFECTION, SITE NOT SPECIFIED: ICD-10-CM

## 2024-05-28 RX ORDER — AMOXICILLIN AND CLAVULANATE POTASSIUM 500; 125 MG/1; MG/1
500-125 TABLET, FILM COATED ORAL
Qty: 14 | Refills: 0 | Status: ACTIVE | COMMUNITY
Start: 2024-05-28 | End: 1900-01-01

## 2024-06-11 ENCOUNTER — TRANSCRIPTION ENCOUNTER (OUTPATIENT)
Age: 89
End: 2024-06-11

## 2024-07-05 ENCOUNTER — NON-APPOINTMENT (OUTPATIENT)
Age: 89
End: 2024-07-05

## 2024-07-10 ENCOUNTER — APPOINTMENT (OUTPATIENT)
Dept: HOME HEALTH SERVICES | Facility: HOME HEALTH | Age: 89
End: 2024-07-10
Payer: MEDICARE

## 2024-07-10 VITALS
TEMPERATURE: 97 F | DIASTOLIC BLOOD PRESSURE: 84 MMHG | OXYGEN SATURATION: 98 % | SYSTOLIC BLOOD PRESSURE: 138 MMHG | RESPIRATION RATE: 18 BRPM | HEART RATE: 77 BPM

## 2024-07-10 DIAGNOSIS — Z74.09 OTHER REDUCED MOBILITY: ICD-10-CM

## 2024-07-10 DIAGNOSIS — E78.5 HYPERLIPIDEMIA, UNSPECIFIED: ICD-10-CM

## 2024-07-10 DIAGNOSIS — F03.90 UNSPECIFIED DEMENTIA W/OUT BEHAVIORAL DISTURBANCE: ICD-10-CM

## 2024-07-10 DIAGNOSIS — I10 ESSENTIAL (PRIMARY) HYPERTENSION: ICD-10-CM

## 2024-07-10 PROCEDURE — 99349 HOME/RES VST EST MOD MDM 40: CPT

## 2024-07-10 NOTE — PATIENT PROFILE ADULT - LEGAL HELP
no
Bed/Stretcher in lowest position, wheels locked, appropriate side rails in place/Call bell, personal items and telephone in reach/Instruct patient to call for assistance before getting out of bed/chair/stretcher/Non-slip footwear applied when patient is off stretcher/[place] to call system/Physically safe environment - no spills, clutter or unnecessary equipment/Purposeful proactive rounding/Room/bathroom lighting operational, light cord in reach

## 2024-07-24 ENCOUNTER — APPOINTMENT (OUTPATIENT)
Dept: HOME HEALTH SERVICES | Facility: HOME HEALTH | Age: 89
End: 2024-07-24

## 2024-07-24 VITALS
RESPIRATION RATE: 20 BRPM | DIASTOLIC BLOOD PRESSURE: 84 MMHG | HEART RATE: 77 BPM | OXYGEN SATURATION: 98 % | TEMPERATURE: 97.9 F | SYSTOLIC BLOOD PRESSURE: 136 MMHG

## 2024-07-24 VITALS — SYSTOLIC BLOOD PRESSURE: 140 MMHG | DIASTOLIC BLOOD PRESSURE: 88 MMHG

## 2024-07-25 ENCOUNTER — LABORATORY RESULT (OUTPATIENT)
Age: 89
End: 2024-07-25

## 2024-07-25 ENCOUNTER — NON-APPOINTMENT (OUTPATIENT)
Age: 89
End: 2024-07-25

## 2024-07-29 ENCOUNTER — NON-APPOINTMENT (OUTPATIENT)
Age: 89
End: 2024-07-29

## 2024-07-29 DIAGNOSIS — N39.0 URINARY TRACT INFECTION, SITE NOT SPECIFIED: ICD-10-CM

## 2024-07-29 RX ORDER — SULFAMETHOXAZOLE AND TRIMETHOPRIM 800; 160 MG/1; MG/1
800-160 TABLET ORAL TWICE DAILY
Qty: 6 | Refills: 0 | Status: ACTIVE | COMMUNITY
Start: 2024-07-29 | End: 1900-01-01

## 2024-07-30 ENCOUNTER — NON-APPOINTMENT (OUTPATIENT)
Age: 89
End: 2024-07-30

## 2024-07-30 LAB
APPEARANCE: ABNORMAL
BILIRUBIN URINE: NEGATIVE
BLOOD URINE: ABNORMAL
COLOR: YELLOW
GLUCOSE QUALITATIVE U: NEGATIVE MG/DL
KETONES URINE: NEGATIVE MG/DL
LEUKOCYTE ESTERASE URINE: ABNORMAL
NITRITE URINE: NEGATIVE
PH URINE: 6
PROTEIN URINE: 30 MG/DL
SPECIFIC GRAVITY URINE: 1.01
UROBILINOGEN URINE: 0.2 MG/DL

## 2024-09-12 ENCOUNTER — APPOINTMENT (OUTPATIENT)
Dept: HOME HEALTH SERVICES | Facility: HOME HEALTH | Age: 89
End: 2024-09-12

## 2024-09-12 VITALS
DIASTOLIC BLOOD PRESSURE: 82 MMHG | RESPIRATION RATE: 19 BRPM | TEMPERATURE: 97 F | HEART RATE: 85 BPM | OXYGEN SATURATION: 96 % | SYSTOLIC BLOOD PRESSURE: 124 MMHG

## 2024-09-27 RX ORDER — SULFAMETHOXAZOLE AND TRIMETHOPRIM 800; 160 MG/1; MG/1
800-160 TABLET ORAL TWICE DAILY
Qty: 14 | Refills: 0 | Status: ACTIVE | COMMUNITY
Start: 2024-09-27 | End: 1900-01-01

## 2024-10-04 NOTE — SWALLOW BEDSIDE ASSESSMENT ADULT - SWALLOW EVAL: DIAGNOSIS
This encounter was created for [facility] orders only .    Toleration of regular with small sips of thin liquids w/o overt s/s of penetration/aspiration. Cough x1 noticed for consecutive sips of thin liquids via straw sips.

## 2024-11-01 ENCOUNTER — APPOINTMENT (OUTPATIENT)
Dept: HOME HEALTH SERVICES | Facility: HOME HEALTH | Age: 89
End: 2024-11-01
Payer: MEDICARE

## 2024-11-01 VITALS
OXYGEN SATURATION: 99 % | RESPIRATION RATE: 16 BRPM | HEART RATE: 76 BPM | DIASTOLIC BLOOD PRESSURE: 60 MMHG | SYSTOLIC BLOOD PRESSURE: 118 MMHG

## 2024-11-01 DIAGNOSIS — F03.90 UNSPECIFIED DEMENTIA W/OUT BEHAVIORAL DISTURBANCE: ICD-10-CM

## 2024-11-01 DIAGNOSIS — Z74.09 OTHER REDUCED MOBILITY: ICD-10-CM

## 2024-11-01 DIAGNOSIS — E78.5 HYPERLIPIDEMIA, UNSPECIFIED: ICD-10-CM

## 2024-11-01 DIAGNOSIS — N39.0 URINARY TRACT INFECTION, SITE NOT SPECIFIED: ICD-10-CM

## 2024-11-01 DIAGNOSIS — I10 ESSENTIAL (PRIMARY) HYPERTENSION: ICD-10-CM

## 2024-11-01 PROCEDURE — 99349 HOME/RES VST EST MOD MDM 40: CPT

## 2024-11-05 ENCOUNTER — LABORATORY RESULT (OUTPATIENT)
Age: 89
End: 2024-11-05

## 2024-11-07 ENCOUNTER — NON-APPOINTMENT (OUTPATIENT)
Age: 89
End: 2024-11-07

## 2024-11-08 ENCOUNTER — NON-APPOINTMENT (OUTPATIENT)
Age: 89
End: 2024-11-08

## 2024-11-09 DIAGNOSIS — N39.0 URINARY TRACT INFECTION, SITE NOT SPECIFIED: ICD-10-CM

## 2024-12-31 ENCOUNTER — NON-APPOINTMENT (OUTPATIENT)
Age: 88
End: 2024-12-31

## 2024-12-31 RX ORDER — CIPROFLOXACIN HYDROCHLORIDE 500 MG/1
500 TABLET, FILM COATED ORAL
Qty: 20 | Refills: 0 | Status: ACTIVE | COMMUNITY
Start: 2024-12-31 | End: 1900-01-01

## 2025-03-03 ENCOUNTER — APPOINTMENT (OUTPATIENT)
Dept: HOME HEALTH SERVICES | Facility: HOME HEALTH | Age: 89
End: 2025-03-03
Payer: MEDICARE

## 2025-03-03 ENCOUNTER — TRANSCRIPTION ENCOUNTER (OUTPATIENT)
Age: 89
End: 2025-03-03

## 2025-03-03 VITALS
TEMPERATURE: 97.3 F | SYSTOLIC BLOOD PRESSURE: 136 MMHG | HEART RATE: 77 BPM | DIASTOLIC BLOOD PRESSURE: 74 MMHG | RESPIRATION RATE: 17 BRPM | OXYGEN SATURATION: 99 %

## 2025-03-03 DIAGNOSIS — N39.0 URINARY TRACT INFECTION, SITE NOT SPECIFIED: ICD-10-CM

## 2025-03-03 DIAGNOSIS — Z74.09 OTHER REDUCED MOBILITY: ICD-10-CM

## 2025-03-03 DIAGNOSIS — E78.5 HYPERLIPIDEMIA, UNSPECIFIED: ICD-10-CM

## 2025-03-03 DIAGNOSIS — I10 ESSENTIAL (PRIMARY) HYPERTENSION: ICD-10-CM

## 2025-03-03 DIAGNOSIS — F03.90 UNSPECIFIED DEMENTIA W/OUT BEHAVIORAL DISTURBANCE: ICD-10-CM

## 2025-03-03 PROCEDURE — 99349 HOME/RES VST EST MOD MDM 40: CPT

## 2025-03-06 PROBLEM — N39.0 ACUTE UTI: Status: RESOLVED | Noted: 2024-05-28 | Resolved: 2025-03-06

## 2025-05-14 ENCOUNTER — APPOINTMENT (OUTPATIENT)
Dept: HOME HEALTH SERVICES | Facility: HOME HEALTH | Age: 89
End: 2025-05-14

## 2025-05-14 VITALS
OXYGEN SATURATION: 97 % | DIASTOLIC BLOOD PRESSURE: 72 MMHG | RESPIRATION RATE: 18 BRPM | HEART RATE: 89 BPM | TEMPERATURE: 98.2 F | SYSTOLIC BLOOD PRESSURE: 126 MMHG

## 2025-05-20 ENCOUNTER — NON-APPOINTMENT (OUTPATIENT)
Age: 89
End: 2025-05-20

## 2025-06-13 ENCOUNTER — TRANSCRIPTION ENCOUNTER (OUTPATIENT)
Age: 89
End: 2025-06-13

## 2025-06-13 ENCOUNTER — NON-APPOINTMENT (OUTPATIENT)
Age: 89
End: 2025-06-13

## 2025-06-14 ENCOUNTER — TRANSCRIPTION ENCOUNTER (OUTPATIENT)
Age: 89
End: 2025-06-14

## 2025-06-20 LAB — URINALYSIS + MICROSCOPIC EXAMINATION: NORMAL

## 2025-06-25 ENCOUNTER — APPOINTMENT (OUTPATIENT)
Dept: HOME HEALTH SERVICES | Facility: HOME HEALTH | Age: 89
End: 2025-06-25
Payer: MEDICARE

## 2025-06-25 PROCEDURE — 99349 HOME/RES VST EST MOD MDM 40: CPT

## 2025-07-07 ENCOUNTER — LABORATORY RESULT (OUTPATIENT)
Age: 89
End: 2025-07-07

## 2025-07-07 ENCOUNTER — NON-APPOINTMENT (OUTPATIENT)
Age: 89
End: 2025-07-07

## 2025-07-22 ENCOUNTER — NON-APPOINTMENT (OUTPATIENT)
Age: 89
End: 2025-07-22

## 2025-07-24 LAB
APPEARANCE: ABNORMAL
BACTERIA: ABNORMAL /HPF
BILIRUBIN URINE: NEGATIVE
BLOOD URINE: ABNORMAL
CAST: 3 /LPF
COLOR: YELLOW
EPITHELIAL CELLS: 2 /HPF
GLUCOSE QUALITATIVE U: NEGATIVE MG/DL
KETONES URINE: ABNORMAL MG/DL
LEUKOCYTE ESTERASE URINE: ABNORMAL
MICROSCOPIC-UA: NORMAL
NITRITE URINE: NEGATIVE
PH URINE: 5.5
PROTEIN URINE: 100 MG/DL
RED BLOOD CELLS URINE: 3 /HPF
REVIEW: NORMAL
SPECIFIC GRAVITY URINE: 1.02
UROBILINOGEN URINE: 0.2 MG/DL
WHITE BLOOD CELLS URINE: >998 /HPF

## 2025-07-25 ENCOUNTER — NON-APPOINTMENT (OUTPATIENT)
Age: 89
End: 2025-07-25

## 2025-07-25 DIAGNOSIS — N39.0 URINARY TRACT INFECTION, SITE NOT SPECIFIED: ICD-10-CM

## 2025-07-25 RX ORDER — SULFAMETHOXAZOLE AND TRIMETHOPRIM 800; 160 MG/1; MG/1
800-160 TABLET ORAL TWICE DAILY
Qty: 6 | Refills: 0 | Status: ACTIVE | COMMUNITY
Start: 2025-07-25 | End: 1900-01-01

## 2025-07-28 ENCOUNTER — NON-APPOINTMENT (OUTPATIENT)
Age: 89
End: 2025-07-28

## 2025-07-28 RX ORDER — NITROFURANTOIN MACROCRYSTALS 100 MG/1
100 CAPSULE ORAL
Qty: 10 | Refills: 0 | Status: ACTIVE | COMMUNITY
Start: 2025-07-28 | End: 1900-01-01

## 2025-07-29 RX ORDER — AMOXICILLIN AND CLAVULANATE POTASSIUM 875; 125 MG/1; MG/1
875-125 TABLET, COATED ORAL TWICE DAILY
Qty: 10 | Refills: 0 | Status: ACTIVE | COMMUNITY
Start: 2025-07-29 | End: 1900-01-01

## 2025-08-07 LAB — BACTERIA UR CULT: NORMAL

## 2025-08-20 ENCOUNTER — NON-APPOINTMENT (OUTPATIENT)
Age: 89
End: 2025-08-20

## 2025-08-24 LAB
APPEARANCE: CLEAR
BACTERIA: NEGATIVE /HPF
BILIRUBIN URINE: NEGATIVE
BLOOD URINE: NEGATIVE
CAST: 1 /LPF
COLOR: YELLOW
EPITHELIAL CELLS: 1 /HPF
GLUCOSE QUALITATIVE U: NEGATIVE MG/DL
KETONES URINE: NEGATIVE MG/DL
LEUKOCYTE ESTERASE URINE: ABNORMAL
MICROSCOPIC-UA: NORMAL
NITRITE URINE: NEGATIVE
PH URINE: 6.5
PROTEIN URINE: NEGATIVE MG/DL
RED BLOOD CELLS URINE: 1 /HPF
SPECIFIC GRAVITY URINE: 1.01
UROBILINOGEN URINE: 0.2 MG/DL
WHITE BLOOD CELLS URINE: 5 /HPF

## 2025-08-26 ENCOUNTER — NON-APPOINTMENT (OUTPATIENT)
Age: 89
End: 2025-08-26

## 2025-08-28 LAB — BACTERIA UR CULT: NORMAL

## 2025-09-05 ENCOUNTER — APPOINTMENT (OUTPATIENT)
Dept: HOME HEALTH SERVICES | Facility: HOME HEALTH | Age: 89
End: 2025-09-05

## 2025-09-05 VITALS
DIASTOLIC BLOOD PRESSURE: 74 MMHG | HEART RATE: 76 BPM | SYSTOLIC BLOOD PRESSURE: 112 MMHG | RESPIRATION RATE: 17 BRPM | TEMPERATURE: 97.1 F | OXYGEN SATURATION: 99 %

## 2025-09-05 RX ORDER — PHENAZOPYRIDINE HYDROCHLORIDE 100 MG/1
100 TABLET ORAL 3 TIMES DAILY
Qty: 9 | Refills: 0 | Status: ACTIVE | COMMUNITY
Start: 2025-08-26

## 2025-09-05 RX ORDER — ESTRADIOL 0.1 MG/G
0.1 CREAM VAGINAL
Qty: 1 | Refills: 2 | Status: ACTIVE | COMMUNITY
Start: 2025-08-28

## 2025-09-07 PROBLEM — N39.0 RECURRENT UTI (URINARY TRACT INFECTION): Status: ACTIVE | Noted: 2025-09-07

## 2025-09-12 ENCOUNTER — NON-APPOINTMENT (OUTPATIENT)
Age: 89
End: 2025-09-12

## 2025-09-12 RX ORDER — SULFAMETHOXAZOLE AND TRIMETHOPRIM 800; 160 MG/1; MG/1
800-160 TABLET ORAL TWICE DAILY
Qty: 10 | Refills: 0 | Status: ACTIVE | COMMUNITY
Start: 2025-09-12 | End: 1900-01-01

## 2025-09-16 LAB — URINALYSIS + MICROSCOPIC EXAMINATION: NORMAL
